# Patient Record
Sex: FEMALE | Race: WHITE | NOT HISPANIC OR LATINO | Employment: OTHER | URBAN - NONMETROPOLITAN AREA
[De-identification: names, ages, dates, MRNs, and addresses within clinical notes are randomized per-mention and may not be internally consistent; named-entity substitution may affect disease eponyms.]

---

## 2017-01-27 RX ORDER — LORAZEPAM 1 MG/1
1 TABLET ORAL EVERY 8 HOURS PRN
Qty: 60 TAB | Refills: 0 | Status: SHIPPED
Start: 2017-01-27 | End: 2017-01-31 | Stop reason: SDUPTHER

## 2017-01-31 ENCOUNTER — TELEPHONE (OUTPATIENT)
Dept: MEDICAL GROUP | Facility: PHYSICIAN GROUP | Age: 77
End: 2017-01-31

## 2017-01-31 RX ORDER — LORAZEPAM 1 MG/1
1 TABLET ORAL EVERY 8 HOURS PRN
Qty: 60 TAB | Refills: 0 | Status: SHIPPED
Start: 2017-01-31 | End: 2017-02-28 | Stop reason: SDUPTHER

## 2017-03-01 RX ORDER — LORAZEPAM 1 MG/1
TABLET ORAL
Qty: 60 TAB | Refills: 0 | Status: SHIPPED
Start: 2017-03-01 | End: 2017-04-04 | Stop reason: SDUPTHER

## 2017-03-01 RX ORDER — LEVOTHYROXINE SODIUM 88 UG/1
TABLET ORAL
Qty: 30 TAB | Refills: 3 | Status: SHIPPED | OUTPATIENT
Start: 2017-03-01 | End: 2017-06-15 | Stop reason: SDUPTHER

## 2017-03-14 ENCOUNTER — OFFICE VISIT (OUTPATIENT)
Dept: MEDICAL GROUP | Facility: PHYSICIAN GROUP | Age: 77
End: 2017-03-14
Payer: MEDICARE

## 2017-03-14 VITALS
TEMPERATURE: 98.2 F | HEIGHT: 61 IN | WEIGHT: 127 LBS | BODY MASS INDEX: 23.98 KG/M2 | DIASTOLIC BLOOD PRESSURE: 66 MMHG | RESPIRATION RATE: 14 BRPM | HEART RATE: 52 BPM | SYSTOLIC BLOOD PRESSURE: 118 MMHG | OXYGEN SATURATION: 96 %

## 2017-03-14 DIAGNOSIS — E03.9 HYPOTHYROIDISM, UNSPECIFIED TYPE: ICD-10-CM

## 2017-03-14 DIAGNOSIS — M25.562 LEFT KNEE PAIN, UNSPECIFIED CHRONICITY: ICD-10-CM

## 2017-03-14 DIAGNOSIS — K21.9 GASTROESOPHAGEAL REFLUX DISEASE WITHOUT ESOPHAGITIS: ICD-10-CM

## 2017-03-14 DIAGNOSIS — I49.9 IRREGULAR HEARTBEAT: ICD-10-CM

## 2017-03-14 DIAGNOSIS — F41.0 PANIC ATTACKS: ICD-10-CM

## 2017-03-14 DIAGNOSIS — I10 ESSENTIAL HYPERTENSION: ICD-10-CM

## 2017-03-14 DIAGNOSIS — F32.9 REACTIVE DEPRESSION: ICD-10-CM

## 2017-03-14 DIAGNOSIS — R00.1 BRADYCARDIA: ICD-10-CM

## 2017-03-14 PROCEDURE — 99214 OFFICE O/P EST MOD 30 MIN: CPT | Performed by: NURSE PRACTITIONER

## 2017-03-14 PROCEDURE — G8484 FLU IMMUNIZE NO ADMIN: HCPCS | Performed by: NURSE PRACTITIONER

## 2017-03-14 PROCEDURE — G8599 NO ASA/ANTIPLAT THER USE RNG: HCPCS | Performed by: NURSE PRACTITIONER

## 2017-03-14 PROCEDURE — G8420 CALC BMI NORM PARAMETERS: HCPCS | Performed by: NURSE PRACTITIONER

## 2017-03-14 PROCEDURE — 0518F FALL PLAN OF CARE DOCD: CPT | Mod: 8P | Performed by: NURSE PRACTITIONER

## 2017-03-14 PROCEDURE — 3288F FALL RISK ASSESSMENT DOCD: CPT | Mod: 8P | Performed by: NURSE PRACTITIONER

## 2017-03-14 PROCEDURE — G8432 DEP SCR NOT DOC, RNG: HCPCS | Performed by: NURSE PRACTITIONER

## 2017-03-14 PROCEDURE — 4040F PNEUMOC VAC/ADMIN/RCVD: CPT | Mod: 8P | Performed by: NURSE PRACTITIONER

## 2017-03-14 PROCEDURE — 1036F TOBACCO NON-USER: CPT | Performed by: NURSE PRACTITIONER

## 2017-03-14 PROCEDURE — 1100F PTFALLS ASSESS-DOCD GE2>/YR: CPT | Performed by: NURSE PRACTITIONER

## 2017-03-14 PROCEDURE — 93000 ELECTROCARDIOGRAM COMPLETE: CPT | Performed by: NURSE PRACTITIONER

## 2017-03-14 NOTE — PROGRESS NOTES
"Chief Complaint   Patient presents with   • Thyroid Problem     questions       HISTORY OF PRESENT ILLNESS: Patient is a @ age 76  here  today to discuss:    Interval history:     Hospitalizations NO     Injuries NO     Illness YES \" stated panic attack, EMS called, did not go to hospital.    Review of Systems   Constitutional: Negative for fever, chills, weight loss and Fatigue related to her anxiety  HENT: Negative for ear pain, nosebleeds, congestion, sore throat and neck pain.    Eyes: Negative for blurred vision.   Respiratory: Negative for cough, sputum production, shortness of breath and wheezing.    Cardiovascular: Negative for chest pain, palpitations, orthopnea and leg swelling.   Gastrointestinal: Negative for heartburn, nausea, vomiting and abdominal pain.   Genitourinary: Negative for dysuria, urgency and frequency.   Musculoskeletal: Severe bilateral knee pain  Skin: Negative for rash and itching.   Neurological: Negative for dizziness, tingling, tremors, sensory change, focal weakness and headaches.   Endo/Heme/Allergies: Does not bruise/bleed easily.   Psychiatric/Behavioral: DEPRESSED and anxious.           Hypothyroidism  Patient has chronic hypothyroidism. Patient takes levothyroxine 88 mcg daily. She is due for labs.     Panic attacks  Patient recently had a panic attack about 2 weeks ago. She felt lightheaded and jittery. She developed numbess and pain in arm. She call EMS. They assessed her and felt she was just having panic attack.       Depression  Patient states she is still having problems with mood and depression. She has multiple family problems that cause stress and depression. She remembers her childhood and problems, she does not want to do this, but her mind wanders. She then has physical symptoms.     GERD (gastroesophageal reflux disease)  Patient continues to use OTC gaviscon for relief. Tries to relieve the symptoms by curbing her diet.     Hypertension  Patient has normal blood " pressure today.   Symptoms:  Headache no , Weakness no , Visual loss no , Chest pain YES DURING anxiety, Dyspnea : NO , Claudication NO Swelling NO   Taking medication: NONE  Diet : standard diet  Exercise Limited due to knee pain.       Left knee pain  Patient is trying to rehab her knee. No surgery. No redness or swelling.     Irregular heartbeat  Patient has has extra heart sound left sternal border. Possible murmur.        Allergies:Tramadol    Current Outpatient Prescriptions Ordered in [facility]   Medication Sig Dispense Refill   • levothyroxine (SYNTHROID) 88 MCG Tab TAKE 1 TABLET BY MOUTH EVERY MORNING ON AN EMPTY STOMACH.  30 Tab 3   • lorazepam (ATIVAN) 1 MG Tab TAKE 1 TABLET BY MOUTH EVERY 8 HOURS AS NEEDED FOR ANXIETY. 60 Tab 0   • DHA-Vitamin C-Lutein (EYE HEALTH FORMULA PO) Take  by mouth.     • Cholecalciferol (VITAMIN D3) 2000 UNITS Tab Take  by mouth.     • Turmeric 450 MG Cap Take  by mouth.     • GLUCOSAMINE HCL PO Take 1,200 mg by mouth.     • ibuprofen (MOTRIN) 200 MG Tab Take 200 mg by mouth every 6 hours as needed.     • Diclofenac Sodium 1 % Gel Apply 4 g to skin as directed 3 times a day as needed. 100 g 0   • acetaminophen (TYLENOL) 500 MG TABS Take 500-1,000 mg by mouth every 6 hours as needed.       No current Epic-ordered facility-administered medications on file.       Past Medical History   Diagnosis Date   • Panic attacks 5/15/2013   • Depression 5/15/2013   • Osteopenia    • Osteoarthritis 5/15/2013   • Hypothyroidism 5/15/2013   • Vertigo 10/15/2014   • Bradycardia 10/15/2014   • Hyperlipidemia 2014   • GERD (gastroesophageal reflux disease) 3/18/2015   • Hyperlipemia 3/18/2015   • Hypertension    • Hypothyroid        Social History   Substance Use Topics   • Smoking status: Never Smoker    • Smokeless tobacco: Never Used   • Alcohol Use: 0.0 oz/week     0 Standard drinks or equivalent per week      Comment: rare       Family Status   Relation Status Death Age   • Father    "  • Mother       Family History   Problem Relation Age of Onset   • Heart Disease Father    • Diabetes Father    • Arthritis Mother      RA       ROS: As documented in my HPI      Exam:  Blood pressure 118/66, pulse 52, temperature 36.8 °C (98.2 °F), resp. rate 14, height 1.549 m (5' 1\"), weight 57.607 kg (127 lb), SpO2 96 %.  General:  Well nourished, well developed female in NAD  Head: Nontender scalp. No lesions  Neck: Supple. Symmetric Thyroid palpated. No bruits  Pulmonary:  Normal effort. No rales, ronchi, or wheezing.  Cardiovascular: RRR but unusual flow murmur. Left sternal border  Abdomen: Soft nontender, normal bowel sounds  Extremities: no clubbing, cyanosis, or edema.  Psych: Alert and oriented x3.  Emotional: Mood tearful  Affect pleasant and appropriate.     Neurological: No focal deficits    Please note that this dictation was created using voice recognition software. I have made every reasonable attempt to correct obvious errors, but I expect that there are errors of grammar and possibly content that I did not discover before finalizing the note.    Assessment/Plan:  1. Hypothyroidism, unspecified type - need labs.  Current status of condition is chronic and controlled on therapy.   TSH+FREE T4    COMP METABOLIC PANEL   2. Panic attacks - not controlled . Does use ativan as necessary, has enough medication VITAMIN D,25 HYDROXY    LIPID PANEL    REFERRAL TO BEHAVIORAL HEALTH   3. Reactive depression  REFERRAL TO BEHAVIORAL HEALTH   4. Gastroesophageal reflux disease without esophagitis  Current status of condition is chronic and controlled on therapy.     5. Essential hypertension  NO rx but controlled   6. Left knee pain, unspecified chronicity  Not controlled recommended to return to ortho.   7. Irregular heartbeat  EKG - Clinic Performed    REFERRAL TO CARDIOLOGY   8. Bradycardia   Stable            "

## 2017-03-14 NOTE — ASSESSMENT & PLAN NOTE
Patient has chronic hypothyroidism. Patient takes levothyroxine 88 mcg daily. She is due for labs.

## 2017-03-14 NOTE — ASSESSMENT & PLAN NOTE
Patient recently had a panic attack about 2 weeks ago. She felt lightheaded and jittery. She developed numbess and pain in arm. She call EMS. They assessed her and felt she was just having panic attack.

## 2017-03-14 NOTE — ASSESSMENT & PLAN NOTE
Patient continues to use OTC gaviscon for relief. Tries to relieve the symptoms by curbing her diet.

## 2017-03-14 NOTE — MR AVS SNAPSHOT
"        Lolly Florence   3/14/2017 1:00 PM   Office Visit   MRN: [id number]    Department:  [facility]   Dept Phone:  [phone]    Description:  Female : 1940   Provider:  AURORA Alford           Reason for Visit     Thyroid Problem questions      Allergies as of 3/14/2017     Allergen Noted Reactions    Tramadol 2016   Shortness of Breath      You were diagnosed with     Hypothyroidism, unspecified type   [3587723]       Panic attacks   [100851]       Reactive depression   [950990]       Gastroesophageal reflux disease without esophagitis   [970760]       Essential hypertension   [3478937]       Left knee pain, unspecified chronicity   [1029090]       Irregular heartbeat   [395024]       Bradycardia   [866819]         Vital Signs     Blood Pressure Pulse Temperature Respirations Height Weight    118/66 mmHg 52 36.8 °C (98.2 °F) 14 1.549 m (5' 1\") 57.607 kg (127 lb)    Body Mass Index Oxygen Saturation Smoking Status             24.01 kg/m2 96% Never Smoker          Basic Information     Date Of Birth Sex Race Ethnicity Preferred Language    1940 Female White Non- English      Problem List              ICD-10-CM Priority Class Noted - Resolved    Diverticular disease K57.90   5/15/2013 - Present    Panic attacks F41.0   5/15/2013 - Present    Depression F32.9   5/15/2013 - Present    Osteopenia M85.80   5/15/2013 - Present    Osteoarthritis M19.90   5/15/2013 - Present    Hypothyroidism E03.9   5/15/2013 - Present    Seborrheic keratosis    2013 - Present    Bradycardia R00.1   10/15/2014 - Present    Hyperlipidemia E78.5   2014 - Present    GERD (gastroesophageal reflux disease) K21.9   3/18/2015 - Present    PVC (premature ventricular contraction) I49.3   2015 - Present    Hypertension I10   2015 - Present    Carotid artery stenosis I65.29   2015 - Present    Gallstones K80.20   2015 - Present    Epigastric pain R10.13   2015 - " Present    Macular degeneration H35.30   2/23/2016 - Present    Left knee pain M25.562   7/5/2016 - Present    Torn meniscus S83.209A   12/12/2016 - Present    Irregular heartbeat I49.9   3/14/2017 - Present      Health Maintenance        Date Due Completion Dates    IMM DTaP/Tdap/Td Vaccine (1 - Tdap) 6/6/1957 ---    IMM ZOSTER VACCINE 6/6/1998 ---    IMM PNEUMOCOCCAL 65+ (ADULT) LOW/MEDIUM RISK SERIES (1 of 2 - PCV13) 6/6/2003 ---    MAMMOGRAM 5/15/2014 5/15/2013 (Declined)    Override on 5/15/2013: Patient Declined (Discussed with patient, declines)    IMM INFLUENZA (1) 10/2/2017 (Originally 9/1/2016) ---    COLONOSCOPY 1/15/2018 1/15/2013 (Done), 11/30/2012    Override on 1/15/2013: Done (Enio at [facility])    BONE DENSITY 3/15/2018 3/15/2013 (Done)    Override on 3/15/2013: Done (Dr. Nestor PRIETO, [facility])            Current Immunizations     No immunizations on file.      Below and/or attached are the medications your provider expects you to take. Review all of your home medications and newly ordered medications with your provider and/or pharmacist. Follow medication instructions as directed by your provider and/or pharmacist. Please keep your medication list with you and share with your provider. Update the information when medications are discontinued, doses are changed, or new medications (including over-the-counter products) are added; and carry medication information at all times in the event of emergency situations     Allergies:  TRAMADOL - Shortness of Breath               Medications  Valid as of: March 14, 2017 -  1:58 PM    Generic Name Brand Name Tablet Size Instructions for use    Acetaminophen (Tab) TYLENOL 500 MG Take 500-1,000 mg by mouth every 6 hours as needed.        Cholecalciferol (Tab) Vitamin D3 2000 UNITS Take  by mouth.        DHA-Vitamin C-Lutein   Take  by mouth.        Diclofenac Sodium (Gel) Diclofenac Sodium 1 % Apply 4 g to skin as directed 3  times a day as needed.        Glucosamine HCl   Take 1,200 mg by mouth.        Ibuprofen (Tab) MOTRIN 200 MG Take 200 mg by mouth every 6 hours as needed.        Levothyroxine Sodium (Tab) SYNTHROID 88 MCG TAKE 1 TABLET BY MOUTH EVERY MORNING ON AN EMPTY STOMACH.         LORazepam (Tab) ATIVAN 1 MG TAKE 1 TABLET BY MOUTH EVERY 8 HOURS AS NEEDED FOR ANXIETY.        Turmeric (Cap) Turmeric 450 MG Take  by mouth.        .                 Medicines prescribed today were sent to:     [id number] [place], [place] - [address] [place] [address]    Phone: [phone] Fax: [phone]    Open 24 Hours?: No      Medication refill instructions:       If your prescription bottle indicates you have medication refills left, it is not necessary to call your provider’s office. Please contact your pharmacy and they will refill your medication.    If your prescription bottle indicates you do not have any refills left, you may request refills at any time through one of the following ways: The online [employer] system (except Urgent Care), by calling your provider’s office, or by asking your pharmacy to contact your provider’s office with a refill request. Medication refills are processed only during regular business hours and may not be available until the next business day. Your provider may request additional information or to have a follow-up visit with you prior to refilling your medication.   *Please Note: Medication refills are assigned a new Rx number when refilled electronically. Your pharmacy may indicate that no refills were authorized even though a new prescription for the same medication is available at the pharmacy. Please request the medicine by name with the pharmacy before contacting your provider for a refill.        Your To Do List     Future Labs/Procedures Complete By Expires    COMP METABOLIC PANEL  As directed 3/14/2018    VITAMIN D,25 HYDROXY  As directed 3/14/2018         Referral     A referral request has been sent to our patient care coordination department. Please allow 3-5 business days for us to process this request and contact you either by phone or mail. If you do not hear from us by the 5th business day, please call us at [phone].           MyChart Status: Patient Declined

## 2017-03-14 NOTE — ASSESSMENT & PLAN NOTE
Patient has normal blood pressure today.   Symptoms:  Headache no , Weakness no , Visual loss no , Chest pain YES DURING anxiety, Dyspnea : NO , Claudication NO Swelling NO   Taking medication: NONE  Diet : standard diet  Exercise Limited due to knee pain.

## 2017-03-14 NOTE — ASSESSMENT & PLAN NOTE
Patient states she is still having problems with mood and depression. She has multiple family problems that cause stress and depression. She remembers her childhood and problems, she does not want to do this, but her mind wanders. She then has physical symptoms.

## 2017-03-20 ENCOUNTER — HOSPITAL ENCOUNTER (OUTPATIENT)
Dept: LAB | Facility: MEDICAL CENTER | Age: 77
End: 2017-03-20
Attending: NURSE PRACTITIONER
Payer: MEDICARE

## 2017-03-20 LAB
CHOLEST SERPL-MCNC: 221 MG/DL (ref 100–199)
HDLC SERPL-MCNC: 54 MG/DL
LDLC SERPL CALC-MCNC: 138 MG/DL
T4 FREE SERPL-MCNC: 1.24 NG/DL (ref 0.53–1.43)
TRIGL SERPL-MCNC: 147 MG/DL (ref 0–149)
TSH SERPL DL<=0.005 MIU/L-ACNC: 1.05 UIU/ML (ref 0.3–3.7)

## 2017-03-20 PROCEDURE — 36415 COLL VENOUS BLD VENIPUNCTURE: CPT

## 2017-03-20 PROCEDURE — 84443 ASSAY THYROID STIM HORMONE: CPT

## 2017-03-20 PROCEDURE — 80061 LIPID PANEL: CPT

## 2017-03-20 PROCEDURE — 84439 ASSAY OF FREE THYROXINE: CPT

## 2017-03-27 ENCOUNTER — APPOINTMENT (OUTPATIENT)
Dept: RADIOLOGY | Facility: IMAGING CENTER | Age: 77
End: 2017-03-27
Attending: PHYSICIAN ASSISTANT
Payer: MEDICARE

## 2017-03-27 ENCOUNTER — OFFICE VISIT (OUTPATIENT)
Dept: URGENT CARE | Facility: PHYSICIAN GROUP | Age: 77
End: 2017-03-27
Payer: MEDICARE

## 2017-03-27 VITALS
BODY MASS INDEX: 23.22 KG/M2 | OXYGEN SATURATION: 91 % | HEART RATE: 90 BPM | RESPIRATION RATE: 12 BRPM | HEIGHT: 61 IN | WEIGHT: 123 LBS | SYSTOLIC BLOOD PRESSURE: 114 MMHG | DIASTOLIC BLOOD PRESSURE: 76 MMHG | TEMPERATURE: 99.2 F
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
## 2017-03-27 DIAGNOSIS — R07.81 RIB PAIN ON RIGHT SIDE: ICD-10-CM

## 2017-03-27 DIAGNOSIS — R07.81 RIB PAIN ON LEFT SIDE: ICD-10-CM

## 2017-03-27 DIAGNOSIS — R06.02 SOB (SHORTNESS OF BREATH): ICD-10-CM
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
## 2017-03-27 PROCEDURE — 0518F FALL PLAN OF CARE DOCD: CPT | Mod: 8P | Performed by: PHYSICIAN ASSISTANT

## 2017-03-27 PROCEDURE — 4040F PNEUMOC VAC/ADMIN/RCVD: CPT | Mod: 8P | Performed by: PHYSICIAN ASSISTANT

## 2017-03-27 PROCEDURE — 99214 OFFICE O/P EST MOD 30 MIN: CPT | Performed by: PHYSICIAN ASSISTANT

## 2017-03-27 PROCEDURE — 3288F FALL RISK ASSESSMENT DOCD: CPT | Mod: 8P | Performed by: PHYSICIAN ASSISTANT

## 2017-03-27 PROCEDURE — G8420 CALC BMI NORM PARAMETERS: HCPCS | Performed by: PHYSICIAN ASSISTANT

## 2017-03-27 PROCEDURE — G8484 FLU IMMUNIZE NO ADMIN: HCPCS | Performed by: PHYSICIAN ASSISTANT

## 2017-03-27 PROCEDURE — 71020 DX-CHEST-2 VIEWS: CPT | Mod: 26 | Performed by: PHYSICIAN ASSISTANT

## 2017-03-27 PROCEDURE — G8599 NO ASA/ANTIPLAT THER USE RNG: HCPCS | Performed by: PHYSICIAN ASSISTANT

## 2017-03-27 PROCEDURE — 1100F PTFALLS ASSESS-DOCD GE2>/YR: CPT | Performed by: PHYSICIAN ASSISTANT

## 2017-03-27 PROCEDURE — G8432 DEP SCR NOT DOC, RNG: HCPCS | Performed by: PHYSICIAN ASSISTANT

## 2017-03-27 PROCEDURE — 1036F TOBACCO NON-USER: CPT | Performed by: PHYSICIAN ASSISTANT

## 2017-03-27 NOTE — MR AVS SNAPSHOT
"        Lolly Florence   3/27/2017 1:30 PM   Office Visit   MRN: [id number]    Department:  [facility]   Dept Phone:  [phone]    Description:  Female : 1940   Provider:  Meghan Riley PA-C           Reason for Visit     Rib Injury Pt fell from chair, injuring ribs, difficulty breathing      Allergies as of 3/27/2017     Allergen Noted Reactions    Tramadol 2016   Shortness of Breath      You were diagnosed with     Fall at home, initial encounter   [612626]       Rib pain on left side   [517210]       Rib pain on right side   [483373]       SOB (shortness of breath)   [581444]         Vital Signs     Blood Pressure Pulse Temperature Respirations Height Weight    114/76 mmHg 90 37.3 °C (99.2 °F) 12 1.549 m (5' 1\") 55.792 kg (123 lb)    Body Mass Index Oxygen Saturation Smoking Status             23.25 kg/m2 91% Never Smoker          Basic Information     Date Of Birth Sex Race Ethnicity Preferred Language    1940 Female White Non- English      Your appointments     2017 10:00 AM   Telemedicine Clinic New Patient with Mati Dominguez MD,FACC, [facility] -[facility] ([place])    [facility] [facility] [id number] [phone]              Problem List              ICD-10-CM Priority Class Noted - Resolved    Diverticular disease K57.90   5/15/2013 - Present    Panic attacks F41.0   5/15/2013 - Present    Depression F32.9   5/15/2013 - Present    Osteopenia M85.80   5/15/2013 - Present    Osteoarthritis M19.90   5/15/2013 - Present    Hypothyroidism E03.9   5/15/2013 - Present    Seborrheic keratosis    2013 - Present    Bradycardia R00.1   10/15/2014 - Present    Hyperlipidemia E78.5   2014 - Present    GERD (gastroesophageal reflux disease) K21.9   3/18/2015 - Present    PVC (premature ventricular contraction) I49.3   2015 - Present    Hypertension I10   2015 - Present   " Carotid artery stenosis I65.29   4/17/2015 - Present    Gallstones K80.20   6/22/2015 - Present    Epigastric pain R10.13   7/7/2015 - Present    Macular degeneration H35.30   2/23/2016 - Present    Left knee pain M25.562   7/5/2016 - Present    Torn meniscus S83.209A   12/12/2016 - Present    Irregular heartbeat I49.9   3/14/2017 - Present      Health Maintenance        Date Due Completion Dates    IMM DTaP/Tdap/Td Vaccine (1 - Tdap) 6/6/1957 ---    IMM ZOSTER VACCINE 6/6/1998 ---    IMM PNEUMOCOCCAL 65+ (ADULT) LOW/MEDIUM RISK SERIES (1 of 2 - PCV13) 6/6/2003 ---    MAMMOGRAM 5/15/2014 5/15/2013 (Declined)    Override on 5/15/2013: Patient Declined (Discussed with patient, declines)    IMM INFLUENZA (1) 10/2/2017 (Originally 9/1/2016) ---    COLONOSCOPY 1/15/2018 1/15/2013 (Done), 11/30/2012    Override on 1/15/2013: Done ([facility] at [facility])    BONE DENSITY 3/15/2018 3/15/2013 (Done)    Override on 3/15/2013: Done (Dr. Nestor PRIETO, [facility])            Current Immunizations     No immunizations on file.      Below and/or attached are the medications your provider expects you to take. Review all of your home medications and newly ordered medications with your provider and/or pharmacist. Follow medication instructions as directed by your provider and/or pharmacist. Please keep your medication list with you and share with your provider. Update the information when medications are discontinued, doses are changed, or new medications (including over-the-counter products) are added; and carry medication information at all times in the event of emergency situations     Allergies:  TRAMADOL - Shortness of Breath               Medications  Valid as of: March 27, 2017 -  2:22 PM    Generic Name Brand Name Tablet Size Instructions for use    Acetaminophen (Tab) TYLENOL 500 MG Take 500-1,000 mg by mouth every 6 hours as needed.        Cholecalciferol (Tab) Vitamin D3 2000 UNITS Take  by  mouth.        DHA-Vitamin C-Lutein   Take  by mouth.        Diclofenac Sodium (Gel) Diclofenac Sodium 1 % Apply 4 g to skin as directed 3 times a day as needed.        Glucosamine HCl   Take 1,200 mg by mouth.        Ibuprofen (Tab) MOTRIN 200 MG Take 200 mg by mouth every 6 hours as needed.        Levothyroxine Sodium (Tab) SYNTHROID 88 MCG TAKE 1 TABLET BY MOUTH EVERY MORNING ON AN EMPTY STOMACH.         LORazepam (Tab) ATIVAN 1 MG TAKE 1 TABLET BY MOUTH EVERY 8 HOURS AS NEEDED FOR ANXIETY.        Turmeric (Cap) Turmeric 450 MG Take  by mouth.        .                 Medicines prescribed today were sent to:     [id number] - [place], [place] - [address]    [address] [place] [address]    Phone: [phone] Fax: [phone]    Open 24 Hours?: No      Medication refill instructions:       If your prescription bottle indicates you have medication refills left, it is not necessary to call your provider’s office. Please contact your pharmacy and they will refill your medication.    If your prescription bottle indicates you do not have any refills left, you may request refills at any time through one of the following ways: The online [employer] system (except Urgent Care), by calling your provider’s office, or by asking your pharmacy to contact your provider’s office with a refill request. Medication refills are processed only during regular business hours and may not be available until the next business day. Your provider may request additional information or to have a follow-up visit with you prior to refilling your medication.   *Please Note: Medication refills are assigned a new Rx number when refilled electronically. Your pharmacy may indicate that no refills were authorized even though a new prescription for the same medication is available at the pharmacy. Please request the medicine by name with the pharmacy before contacting your provider for a refill.        Your To Do List      Future Labs/Procedures Complete By Expires    DX-CHEST-2 VIEWS  As directed 9/26/2017         MyChart Status: Patient Declined

## 2017-03-27 NOTE — PROGRESS NOTES
Chief Complaint   Patient presents with   • Rib Injury     Pt fell from chair, injuring ribs, difficulty breathing       HISTORY OF PRESENT ILLNESS: Patient is a 76 y.o. female who presents today for evaluation of a ground level fall that occurred 6 days ago, last Tuesday. Patient was getting up from her desk when her foot got caught on the leg of the desk, causing her to fall. Patient complains of pain in the ribs on both sides with associated difficulty breathing. She complained of mild dizziness intermittently when she exerts herself. She has worsening pain with deep inspiration. She denies any history of rib problems.    Patient Active Problem List    Diagnosis Date Noted   • Irregular heartbeat 03/14/2017   • Torn meniscus 12/12/2016   • Left knee pain 07/05/2016   • Macular degeneration 02/23/2016   • Epigastric pain 07/07/2015   • Gallstones 06/22/2015   • PVC (premature ventricular contraction) 04/17/2015   • Hypertension 04/17/2015   • Carotid artery stenosis 04/17/2015   • GERD (gastroesophageal reflux disease) 03/18/2015   • Hyperlipidemia 12/02/2014   • Bradycardia 10/15/2014   • Seborrheic keratosis 07/16/2013   • Diverticular disease 05/15/2013   • Panic attacks 05/15/2013   • Depression 05/15/2013   • Osteopenia 05/15/2013   • Osteoarthritis 05/15/2013   • Hypothyroidism 05/15/2013       Allergies:Tramadol    Current Outpatient Prescriptions Ordered in [facility]   Medication Sig Dispense Refill   • levothyroxine (SYNTHROID) 88 MCG Tab TAKE 1 TABLET BY MOUTH EVERY MORNING ON AN EMPTY STOMACH.  30 Tab 3   • lorazepam (ATIVAN) 1 MG Tab TAKE 1 TABLET BY MOUTH EVERY 8 HOURS AS NEEDED FOR ANXIETY. 60 Tab 0   • DHA-Vitamin C-Lutein (EYE HEALTH FORMULA PO) Take  by mouth.     • Cholecalciferol (VITAMIN D3) 2000 UNITS Tab Take  by mouth.     • Turmeric 450 MG Cap Take  by mouth.     • GLUCOSAMINE HCL PO Take 1,200 mg by mouth.     • ibuprofen (MOTRIN) 200 MG Tab Take 200 mg by mouth every 6 hours as needed.     •  "Diclofenac Sodium 1 % Gel Apply 4 g to skin as directed 3 times a day as needed. 100 g 0   • acetaminophen (TYLENOL) 500 MG TABS Take 500-1,000 mg by mouth every 6 hours as needed.       No current Epic-ordered facility-administered medications on file.       Past Medical History   Diagnosis Date   • Panic attacks 5/15/2013   • Depression 5/15/2013   • Osteopenia    • Osteoarthritis 5/15/2013   • Hypothyroidism 5/15/2013   • Vertigo 10/15/2014   • Bradycardia 10/15/2014   • Hyperlipidemia 2014   • GERD (gastroesophageal reflux disease) 3/18/2015   • Hyperlipemia 3/18/2015   • Hypertension    • Hypothyroid        Social History   Substance Use Topics   • Smoking status: Never Smoker    • Smokeless tobacco: Never Used   • Alcohol Use: 0.0 oz/week     0 Standard drinks or equivalent per week      Comment: rare       Family Status   Relation Status Death Age   • Father     • Mother       Family History   Problem Relation Age of Onset   • Heart Disease Father    • Diabetes Father    • Arthritis Mother      RA       ROS:    Review of Systems   Constitutional: Negative for fever, chills, weight loss and malaise/fatigue.   HENT: Negative for ear pain, nosebleeds, congestion, sore throat and neck pain.    Eyes: Negative for blurred vision.   Respiratory: Negative for cough, sputum production, and wheezing.    Cardiovascular: Negative for chest pain, palpitations, orthopnea and leg swelling.   Gastrointestinal: Negative for heartburn, nausea, vomiting and abdominal pain.   Genitourinary: Negative for dysuria, urgency and frequency.       Exam:  Blood pressure 114/76, pulse 90, temperature 37.3 °C (99.2 °F), resp. rate 12, height 1.549 m (5' 1\"), weight 55.792 kg (123 lb), SpO2 91 %.  General: Normal appearing. No distress.  HEENT: Head is grossly normal.  Pulmonary: Clear to ausculation and percussion.  Normal effort. No rales, ronchi, or wheezing.   Cardiovascular: Regular rate and rhythm without murmur. "  No paradoxical movement noted on the ribs.  Neurologic: Grossly nonfocal.  Lymph: No cervical lymphadenopathy noted.  Skin: No obvious lesions.  Psych: Normal mood. Alert and oriented x3. Judgment and insight is normal.    CXR, per radiology:   Impression               No active disease.            Assessment/Plan:  Discussed x-ray results with patient. Discussed appropriate over-the-counter symptomatic medication, and when to return to clinic. Discussed the importance of taking deep breaths several times throughout the day. Follow up for worsening or persistent symptoms.  1. Fall at home, initial encounter     2. Rib pain on left side  DX-CHEST-2 VIEWS   3. Rib pain on right side  DX-CHEST-2 VIEWS   4. SOB (shortness of breath)  DX-CHEST-2 VIEWS

## 2017-04-06 RX ORDER — LORAZEPAM 1 MG/1
TABLET ORAL
Qty: 60 TAB | Refills: 0 | Status: SHIPPED
Start: 2017-04-06 | End: 2017-04-13 | Stop reason: SDUPTHER

## 2017-04-07 ENCOUNTER — TELEPHONE (OUTPATIENT)
Dept: MEDICAL GROUP | Facility: PHYSICIAN GROUP | Age: 77
End: 2017-04-07

## 2017-04-07 NOTE — TELEPHONE ENCOUNTER
Rose Marie requesting refill Ativan. Monica Fowler approved 4/6/17. RX information given to pharmacist over the phone.   Will be ready for  today. Lolly notified

## 2017-06-20 ENCOUNTER — OFFICE VISIT (OUTPATIENT)
Dept: CARDIOLOGY | Facility: PHYSICIAN GROUP | Age: 77
End: 2017-06-20
Payer: MEDICARE

## 2017-06-20 VITALS
HEIGHT: 61 IN | BODY MASS INDEX: 23.6 KG/M2 | OXYGEN SATURATION: 95 % | WEIGHT: 125 LBS | DIASTOLIC BLOOD PRESSURE: 80 MMHG | HEART RATE: 78 BPM | SYSTOLIC BLOOD PRESSURE: 140 MMHG

## 2017-06-20 DIAGNOSIS — E78.5 DYSLIPIDEMIA: ICD-10-CM

## 2017-06-20 DIAGNOSIS — I65.23 BILATERAL CAROTID ARTERY STENOSIS: ICD-10-CM

## 2017-06-20 DIAGNOSIS — I49.3 PVC (PREMATURE VENTRICULAR CONTRACTION): ICD-10-CM

## 2017-06-20 DIAGNOSIS — I10 ESSENTIAL HYPERTENSION, BENIGN: ICD-10-CM

## 2017-06-20 PROCEDURE — 99214 OFFICE O/P EST MOD 30 MIN: CPT | Performed by: INTERNAL MEDICINE

## 2017-06-20 RX ORDER — EZETIMIBE 10 MG/1
10 TABLET ORAL DAILY
Qty: 10 TAB | Refills: 11 | Status: SHIPPED | OUTPATIENT
Start: 2017-06-20 | End: 2018-06-07

## 2017-06-20 ASSESSMENT — ENCOUNTER SYMPTOMS
FEVER: 0
DEPRESSION: 1
SHORTNESS OF BREATH: 0
PALPITATIONS: 1
BLURRED VISION: 0
CONSTIPATION: 1
ABDOMINAL PAIN: 1
ORTHOPNEA: 0
DIARRHEA: 1
CHILLS: 0
INSOMNIA: 0
MYALGIAS: 0
LOSS OF CONSCIOUSNESS: 0
DIZZINESS: 0
PND: 0
NERVOUS/ANXIOUS: 1

## 2017-06-20 NOTE — Clinical Note
[facility] [address]  Phone: [phone]  Fax: [phone]              Lolly Florence  1940    Encounter Date: 6/20/2017    Chemo Jang M.D.          PROGRESS NOTE:  Subjective:   Lolly Florence is a 77 y.o. female who presents today for annual follow up of premature ventricular contractions, hypertension and hyperlipidemia.    Since the patient's last visit on 06/19/15, she has been doing well clinically from cardiac standpoint. She admits to mild palpitations associated with anxiety. She denies chest pain, shortness of breath, nausea/vomiting or diaphoresis. She continues to be under significant stress currently due to family issues. She suffers with anxiety and panic attacks.    Past Medical History   Diagnosis Date   • Panic attacks 5/15/2013   • Depression 5/15/2013   • Osteopenia    • Osteoarthritis 5/15/2013   • Hypothyroidism 5/15/2013   • Vertigo 10/15/2014   • Bradycardia 10/15/2014   • Hyperlipidemia 12/2/2014   • GERD (gastroesophageal reflux disease) 3/18/2015   • Hyperlipemia 3/18/2015   • Hypertension    • Hypothyroid      Past Surgical History   Procedure Laterality Date   • Hiatal hernia repair  2007     Family History   Problem Relation Age of Onset   • Heart Disease Father    • Diabetes Father    • Arthritis Mother      RA     History   Smoking status   • Never Smoker    Smokeless tobacco   • Never Used     Allergies   Allergen Reactions   • Tramadol Shortness of Breath     Medications reviewed.    Outpatient Encounter Prescriptions as of 6/20/2017   Medication Sig Dispense Refill   • lorazepam (ATIVAN) 1 MG Tab TAKE 1 TABLET BY MOUTH EVERY 8 HOURS AS NEEDED FOR ANXIETY. 60 Tab 3   • levothyroxine (SYNTHROID) 88 MCG Tab Take 1 Tab by mouth every morning. ON AN EMPTY STOMACH. 90 Tab 1   • DHA-Vitamin C-Lutein (EYE HEALTH FORMULA PO) Take  by mouth.     • ibuprofen (MOTRIN) 200 MG  "Tab Take 200 mg by mouth every 6 hours as needed.     • acetaminophen (TYLENOL) 500 MG TABS Take 500-1,000 mg by mouth every 6 hours as needed.     • Cholecalciferol (VITAMIN D3) 2000 UNITS Tab Take  by mouth.     • Turmeric 450 MG Cap Take  by mouth.     • GLUCOSAMINE HCL PO Take 1,200 mg by mouth.     • Diclofenac Sodium 1 % Gel Apply 4 g to skin as directed 3 times a day as needed. 100 g 0     No facility-administered encounter medications on file as of 6/20/2017.     Review of Systems   Constitutional: Negative for fever and chills.   HENT: Negative for congestion.    Eyes: Negative for blurred vision.   Respiratory: Negative for shortness of breath.    Cardiovascular: Positive for palpitations. Negative for chest pain, orthopnea, leg swelling and PND.   Gastrointestinal: Positive for abdominal pain, diarrhea and constipation.   Genitourinary: Negative for dysuria.   Musculoskeletal: Negative for myalgias and joint pain.   Skin: Negative for rash.   Neurological: Negative for dizziness and loss of consciousness.   Psychiatric/Behavioral: Positive for depression. The patient is nervous/anxious. The patient does not have insomnia.         Objective:   /80 mmHg  Pulse 78  Ht 1.549 m (5' 0.98\")  Wt 56.7 kg (125 lb)  BMI 23.63 kg/m2  SpO2 95%    Physical Exam   Constitutional: She is oriented to person, place, and time. She appears well-developed and well-nourished.   HENT:   Head: Normocephalic and atraumatic.   Eyes: Conjunctivae are normal. Pupils are equal, round, and reactive to light.   Neck: Normal range of motion. Neck supple.   Cardiovascular: Normal rate and regular rhythm.    Pulmonary/Chest: Effort normal and breath sounds normal.   Abdominal: Soft. Bowel sounds are normal.   Musculoskeletal: Normal range of motion. She exhibits no edema.   Neurological: She is alert and oriented to person, place, and time.   Skin: Skin is warm and dry.   Psychiatric: She exhibits a depressed mood.     CARDIAC " STUDIES/PROCEDURES:    CAROTID ULTRASOUND (08/14/14)  Carotid ultrasound showing mild stenosis.    ECHOCARDIOGRAM CONCLUSIONS (08/14/14)  Echocardiogram showing ejection fraction of >74%, mild mitral regurgitation.    EKG performed on (03/14/17) was reviewed: EKG shows sinus rhythm.  EKG performed on (10/23/14) EKG shows normal sinus rhythm with premature ventricular contraction.    Laboratory results of (03/20/17) were reviewed. Cholesterol profile of 221/147/54/138 noted.  Laboratory results of (11/21/14) Cholesterol profile of 220/179/47/137 noted.    MPI CONCLUSIONS (08/14/14)  No evidence of ischemia or infarct.    Assessment:      1. PVC (premature ventricular contraction)     2. Essential hypertension, benign     3. Dyslipidemia     4. Bilateral carotid artery stenosis       Medical Decision Making:  Today's Assessment / Status / Plan:     1. Premature ventricular contractions: Frequent premature ventricular contractions (12,000) was noted on Holter monitor at [facility] by Dr. Ferguson. We discussed initiation of beta blockade therapy, however, she does not wish to start this medication. We will repeat an echocardiogram.  2. Hypertension: Blood pressure is well controlled.  3. Hyperlipidemia with intolerance to statin therapy and Omega 3 FA: Currently lipid control is not well controlled on diet therapy. We will try Zetia.  4. Carotid artery stenosis: Clinically stable on medical therapy. We will repeat a carotid ultrasound.  5. Medication intolerance: She is intolerant to lisinopril due to dizziness.  6. Health maintenance: Emotional stress: She is undergoing significant emotional stress. Emotional counseling continues to be recommended.    We will follow up in three months.    CC Monica Uribe        No Recipients

## 2017-06-20 NOTE — PROGRESS NOTES
Subjective:   Lolly Florence is a 77 y.o. female who presents today for annual follow up of premature ventricular contractions, hypertension and hyperlipidemia.    Since the patient's last visit on 06/19/15, she has been doing well clinically from cardiac standpoint. She admits to mild palpitations associated with anxiety. She denies chest pain, shortness of breath, nausea/vomiting or diaphoresis. She continues to be under significant stress currently due to family issues. She suffers with anxiety and panic attacks.    Past Medical History   Diagnosis Date   • Panic attacks 5/15/2013   • Depression 5/15/2013   • Osteopenia    • Osteoarthritis 5/15/2013   • Hypothyroidism 5/15/2013   • Vertigo 10/15/2014   • Bradycardia 10/15/2014   • Hyperlipidemia 12/2/2014   • GERD (gastroesophageal reflux disease) 3/18/2015   • Hyperlipemia 3/18/2015   • Hypertension    • Hypothyroid      Past Surgical History   Procedure Laterality Date   • Hiatal hernia repair  2007     Family History   Problem Relation Age of Onset   • Heart Disease Father    • Diabetes Father    • Arthritis Mother      RA     History   Smoking status   • Never Smoker    Smokeless tobacco   • Never Used     Allergies   Allergen Reactions   • Tramadol Shortness of Breath     Medications reviewed.    Outpatient Encounter Prescriptions as of 6/20/2017   Medication Sig Dispense Refill   • lorazepam (ATIVAN) 1 MG Tab TAKE 1 TABLET BY MOUTH EVERY 8 HOURS AS NEEDED FOR ANXIETY. 60 Tab 3   • levothyroxine (SYNTHROID) 88 MCG Tab Take 1 Tab by mouth every morning. ON AN EMPTY STOMACH. 90 Tab 1   • DHA-Vitamin C-Lutein (EYE HEALTH FORMULA PO) Take  by mouth.     • ibuprofen (MOTRIN) 200 MG Tab Take 200 mg by mouth every 6 hours as needed.     • acetaminophen (TYLENOL) 500 MG TABS Take 500-1,000 mg by mouth every 6 hours as needed.     • Cholecalciferol (VITAMIN D3) 2000 UNITS Tab Take  by mouth.     • Turmeric 450 MG Cap Take  by mouth.     • GLUCOSAMINE HCL PO  "Take 1,200 mg by mouth.     • Diclofenac Sodium 1 % Gel Apply 4 g to skin as directed 3 times a day as needed. 100 g 0     No facility-administered encounter medications on file as of 6/20/2017.     Review of Systems   Constitutional: Negative for fever and chills.   HENT: Negative for congestion.    Eyes: Negative for blurred vision.   Respiratory: Negative for shortness of breath.    Cardiovascular: Positive for palpitations. Negative for chest pain, orthopnea, leg swelling and PND.   Gastrointestinal: Positive for abdominal pain, diarrhea and constipation.   Genitourinary: Negative for dysuria.   Musculoskeletal: Negative for myalgias and joint pain.   Skin: Negative for rash.   Neurological: Negative for dizziness and loss of consciousness.   Psychiatric/Behavioral: Positive for depression. The patient is nervous/anxious. The patient does not have insomnia.         Objective:   /80 mmHg  Pulse 78  Ht 1.549 m (5' 0.98\")  Wt 56.7 kg (125 lb)  BMI 23.63 kg/m2  SpO2 95%    Physical Exam   Constitutional: She is oriented to person, place, and time. She appears well-developed and well-nourished.   HENT:   Head: Normocephalic and atraumatic.   Eyes: Conjunctivae are normal. Pupils are equal, round, and reactive to light.   Neck: Normal range of motion. Neck supple.   Cardiovascular: Normal rate and regular rhythm.    Pulmonary/Chest: Effort normal and breath sounds normal.   Abdominal: Soft. Bowel sounds are normal.   Musculoskeletal: Normal range of motion. She exhibits no edema.   Neurological: She is alert and oriented to person, place, and time.   Skin: Skin is warm and dry.   Psychiatric: She exhibits a depressed mood.     CARDIAC STUDIES/PROCEDURES:    CAROTID ULTRASOUND (08/14/14)  Carotid ultrasound showing mild stenosis.    ECHOCARDIOGRAM CONCLUSIONS (08/14/14)  Echocardiogram showing ejection fraction of >74%, mild mitral regurgitation.    EKG performed on (03/14/17) was reviewed: EKG shows sinus " rhythm.  EKG performed on (10/23/14) EKG shows normal sinus rhythm with premature ventricular contraction.    Laboratory results of (03/20/17) were reviewed. Cholesterol profile of 221/147/54/138 noted.  Laboratory results of (11/21/14) Cholesterol profile of 220/179/47/137 noted.    MPI CONCLUSIONS (08/14/14)  No evidence of ischemia or infarct.    Assessment:      1. PVC (premature ventricular contraction)     2. Essential hypertension, benign     3. Dyslipidemia     4. Bilateral carotid artery stenosis       Medical Decision Making:  Today's Assessment / Status / Plan:     1. Premature ventricular contractions: Frequent premature ventricular contractions (12,000) was noted on Holter monitor at [facility] by Dr. Ferguson. We discussed initiation of beta blockade therapy, however, she does not wish to start this medication. We will repeat an echocardiogram.  2. Hypertension: Blood pressure is well controlled.  3. Hyperlipidemia with intolerance to statin therapy and Omega 3 FA: Currently lipid control is not well controlled on diet therapy. We will try Zetia.  4. Carotid artery stenosis: Clinically stable on medical therapy. We will repeat a carotid ultrasound.  5. Medication intolerance: She is intolerant to lisinopril due to dizziness.  6. Health maintenance: Emotional stress: She is undergoing significant emotional stress. Emotional counseling continues to be recommended.    We will follow up in three months.    Monica Erazo Ms.

## 2017-06-27 DIAGNOSIS — E78.5 DYSLIPIDEMIA: ICD-10-CM

## 2017-07-06 ENCOUNTER — TELEPHONE (OUTPATIENT)
Dept: CARDIOLOGY | Facility: MEDICAL CENTER | Age: 77
End: 2017-07-06

## 2017-07-06 DIAGNOSIS — E78.5 DYSLIPIDEMIA: ICD-10-CM

## 2017-07-06 NOTE — TELEPHONE ENCOUNTER
Called both numbers and no answer. LM to please repeat lab work just prior to f/u dada/ SELENA 10/20 and to please let us know that she got this message. Explained that cholesterol needs to be rechecked in 3 months to evaluate effects of starting Zetia. Mailed lab order to pts home.     ----- Message from Chemo Jang M.D. sent at 7/5/2017  3:59 PM PDT -----  Please repeat fasting lipid profile in 3 months.    Thanks.  SELENA.  ----- Message -----     From: Dorys Herrmann R.N.     Sent: 6/27/2017   3:08 PM       To: Chemo Jang M.D.    Follow up scheduled 10/20/2017 with Dr. Jang

## 2017-07-11 RX ORDER — LEVOTHYROXINE SODIUM 88 UG/1
TABLET ORAL
Qty: 30 TAB | Refills: 5 | Status: SHIPPED | OUTPATIENT
Start: 2017-07-11 | End: 2018-01-15 | Stop reason: SDUPTHER

## 2017-07-11 NOTE — TELEPHONE ENCOUNTER
Was the patient seen in the last year in this department? Yes   3/17    Does patient have an active prescription for medications requested? Yes     Received Request Via: Pharmacy

## 2017-07-13 NOTE — TELEPHONE ENCOUNTER
lorazepam (ATIVAN) 1 MG Tab 60 Tab 3 refills 6/19/2017     Rx on 6/19 with 3 refills      Was the patient seen in the last year in this department? Yes     Does patient have an active prescription for medications requested? Yes     Received Request Via: Pharmacy

## 2017-07-14 RX ORDER — LORAZEPAM 1 MG/1
TABLET ORAL
Qty: 60 TAB | Refills: 0 | OUTPATIENT
Start: 2017-07-14

## 2017-08-07 ENCOUNTER — TELEPHONE (OUTPATIENT)
Dept: CARDIOLOGY | Facility: MEDICAL CENTER | Age: 77
End: 2017-08-07

## 2017-08-07 NOTE — TELEPHONE ENCOUNTER
----- Message from Mariangel Phipps sent at 8/7/2017  8:42 AM PDT -----  Regarding: needs orders faxed to [facility]      Patient went to lab for her draw and technician took her lab order for her imaging tests as well. She doesn't know what tests she needs to take, or when. She can be reached at [phone].

## 2017-08-07 NOTE — TELEPHONE ENCOUNTER
Dicussed pts pending lipid panel, echocardiogram and carotid ultrasound and answered pts questioned to clear confusion. Regarding the lipid panel, pt states she never started the Zetia prescribed by Dr. Jang because she couldn't afford it. Educated pt on the importance of following MDs advise and discussing other alternative more affordable medications. Pt declined stating she has been watching her cholesterol for several years with her PCP, Monica Angela, and her total cholesterol has come down significantly over the years without medication.She has made several changes to her diet and will stick to this.  Discussed the reasoning's for JI ordering the echo and carotid and went into detail about her previous carotid duplex showing mild stenosis. Pt agrees to these tests and requests the orders be mailed to her home.

## 2017-08-08 RX ORDER — LORAZEPAM 1 MG/1
TABLET ORAL
Refills: 0 | OUTPATIENT
Start: 2017-08-08

## 2017-08-11 ENCOUNTER — OFFICE VISIT (OUTPATIENT)
Dept: MEDICAL GROUP | Facility: PHYSICIAN GROUP | Age: 77
End: 2017-08-11
Payer: MEDICARE

## 2017-08-11 VITALS
OXYGEN SATURATION: 96 % | BODY MASS INDEX: 24.35 KG/M2 | HEIGHT: 60 IN | RESPIRATION RATE: 14 BRPM | HEART RATE: 72 BPM | TEMPERATURE: 97.9 F | DIASTOLIC BLOOD PRESSURE: 74 MMHG | WEIGHT: 124 LBS | SYSTOLIC BLOOD PRESSURE: 126 MMHG

## 2017-08-11 DIAGNOSIS — M65.351 TRIGGER LITTLE FINGER OF RIGHT HAND: ICD-10-CM

## 2017-08-11 DIAGNOSIS — E03.9 HYPOTHYROIDISM, UNSPECIFIED TYPE: ICD-10-CM

## 2017-08-11 DIAGNOSIS — I65.23 BILATERAL CAROTID ARTERY STENOSIS: ICD-10-CM

## 2017-08-11 DIAGNOSIS — I10 ESSENTIAL HYPERTENSION, BENIGN: ICD-10-CM

## 2017-08-11 DIAGNOSIS — F41.0 PANIC ATTACKS: ICD-10-CM

## 2017-08-11 DIAGNOSIS — S83.209A TORN MENISCUS: ICD-10-CM

## 2017-08-11 DIAGNOSIS — E78.5 DYSLIPIDEMIA: ICD-10-CM

## 2017-08-11 DIAGNOSIS — F32.9 REACTIVE DEPRESSION: ICD-10-CM

## 2017-08-11 DIAGNOSIS — I49.3 PVC (PREMATURE VENTRICULAR CONTRACTION): ICD-10-CM

## 2017-08-11 PROBLEM — I49.9 IRREGULAR HEARTBEAT: Status: RESOLVED | Noted: 2017-03-14 | Resolved: 2017-08-11

## 2017-08-11 PROCEDURE — 99214 OFFICE O/P EST MOD 30 MIN: CPT | Performed by: NURSE PRACTITIONER

## 2017-08-11 RX ORDER — FLUOXETINE 10 MG/1
5 CAPSULE ORAL DAILY
Qty: 30 CAP | Refills: 1 | Status: SHIPPED | OUTPATIENT
Start: 2017-08-11 | End: 2018-06-07

## 2017-08-11 NOTE — ASSESSMENT & PLAN NOTE
Patient went to Cardiology to be seen. Patient has benign htn, hyperlipidemia, anxiety and wanted to do a EKG and Carotid test.  Patient denies chest pain or shortness of breath.

## 2017-08-11 NOTE — ASSESSMENT & PLAN NOTE
Patient was offered Zetia to take from her cardiologist. Patient stated that she will not take the medication. Last Lipid profile was in March. Results for JU MEJIA (MRN [id number]) as of 8/11/2017 16:29   Ref. Range 3/20/2017 08:28 3/27/2017 14:00   Cholesterol,Tot Latest Ref Range: 100-199 mg/dL 221 (H)    Triglycerides Latest Ref Range: 0-149 mg/dL 147    HDL Latest Ref Range: >=40 mg/dL 54    LDL Latest Ref Range: <100 mg/dL 138 (H)

## 2017-08-11 NOTE — ASSESSMENT & PLAN NOTE
Depression Screen (PHQ-2/PHQ-9) 10/15/2014 3/18/2015 12/12/2016   PHQ-2 Total Score 0 6 -   PHQ-2 Total Score - - 3       Interpretation of PHQ-9 Total Score   Score Severity   1-4 No Depression   5-9 Mild Depression   10-14 Moderate Depression   15-19 Moderately Severe Depression   20-27 Severe Depression

## 2017-08-11 NOTE — MR AVS SNAPSHOT
"        Lolly Florence   2017 4:20 PM   Office Visit   MRN: [id number]    Department:  [facility]   Dept Phone:  [phone]    Description:  Female : 1940   Provider:  AURORA Alford           Reason for Visit     Results labs / several questions      Allergies as of 2017     Allergen Noted Reactions    Tramadol 2016   Shortness of Breath      You were diagnosed with     PVC (premature ventricular contraction)   [730041]       Dyslipidemia   [056752]       Panic attacks   [251774]       Reactive depression   [568210]       Trigger little finger of right hand   [561318]         Vital Signs     Blood Pressure Pulse Temperature Respirations Height Weight    126/74 mmHg 72 36.6 °C (97.9 °F) 14 1.514 m (4' 11.6\") 56.246 kg (124 lb)    Body Mass Index Oxygen Saturation Smoking Status             24.54 kg/m2 96% Never Smoker          Basic Information     Date Of Birth Sex Race Ethnicity Preferred Language    1940 Female White Non- English      Your appointments     Oct 20, 2017  1:00 PM   FOLLOW UP with Chemo Jang M.D.   [facility]    [address] [place] [address] [phone]              Problem List              ICD-10-CM Priority Class Noted - Resolved    Diverticular disease K57.90   5/15/2013 - Present    Panic attacks F41.0   5/15/2013 - Present    Depression F32.9   5/15/2013 - Present    Osteopenia M85.80   5/15/2013 - Present    Osteoarthritis M19.90   5/15/2013 - Present    Hypothyroidism E03.9   5/15/2013 - Present    Seborrheic keratosis    2013 - Present    Bradycardia R00.1   10/15/2014 - Present    GERD (gastroesophageal reflux disease) K21.9   3/18/2015 - Present    PVC (premature ventricular contraction) I49.3   2015 - Present    Carotid artery stenosis I65.29   2015 - Present    Gallstones K80.20   2015 - Present    Epigastric pain R10.13   2015 - " Present    Macular degeneration H35.30   2/23/2016 - Present    Left knee pain M25.562   7/5/2016 - Present    Torn meniscus S83.209A   12/12/2016 - Present    Essential hypertension, benign I10   6/20/2017 - Present    Dyslipidemia E78.5   6/20/2017 - Present    Trigger little finger of right hand M65.351   8/11/2017 - Present      Health Maintenance        Date Due Completion Dates    IMM DTaP/Tdap/Td Vaccine (1 - Tdap) 6/6/1957 ---    IMM ZOSTER VACCINE 6/6/1998 ---    IMM PNEUMOCOCCAL 65+ (ADULT) LOW/MEDIUM RISK SERIES (1 of 2 - PCV13) 6/6/2003 ---    MAMMOGRAM 5/15/2014 5/15/2013 (Declined)    Override on 5/15/2013: Patient Declined (Discussed with patient, declines)    IMM INFLUENZA (1) 10/2/2017 (Originally 9/1/2017) ---    COLONOSCOPY 1/15/2018 1/15/2013 (Done), 11/30/2012    Override on 1/15/2013: Done ([place] [facility])    BONE DENSITY 3/15/2018 3/15/2013 (Done)    Override on 3/15/2013: Done (Dr. Nestor PRIETO, [facility])            Current Immunizations     No immunizations on file.      Below and/or attached are the medications your provider expects you to take. Review all of your home medications and newly ordered medications with your provider and/or pharmacist. Follow medication instructions as directed by your provider and/or pharmacist. Please keep your medication list with you and share with your provider. Update the information when medications are discontinued, doses are changed, or new medications (including over-the-counter products) are added; and carry medication information at all times in the event of emergency situations     Allergies:  TRAMADOL - Shortness of Breath               Medications  Valid as of: August 11, 2017 -  4:53 PM    Generic Name Brand Name Tablet Size Instructions for use    Acetaminophen (Tab) TYLENOL 500 MG Take 500-1,000 mg by mouth every 6 hours as needed.        Cholecalciferol (Tab) Vitamin D3 2000 UNITS Take  by mouth.         DHA-Vitamin C-Lutein   Take  by mouth.        Diclofenac Sodium (Gel) Diclofenac Sodium 1 % Apply 4 g to skin as directed 3 times a day as needed.        Ezetimibe (Tab) ZETIA 10 MG Take 1 Tab by mouth every day.        FLUoxetine HCl (Cap) PROZAC 10 MG Take 0.5 Caps by mouth every day.        Glucosamine HCl   Take 1,200 mg by mouth.        Ibuprofen (Tab) MOTRIN 200 MG Take 200 mg by mouth every 6 hours as needed.        Levothyroxine Sodium (Tab) SYNTHROID 88 MCG TAKE 1 TABLET BY MOUTH EVERY MORNING ON AN EMPTY STOMACH        LORazepam (Tab) ATIVAN 1 MG TAKE 1 TABLET BY MOUTH EVERY 8 HOURS AS NEEDED FOR ANXIETY        Turmeric (Cap) Turmeric 450 MG Take  by mouth.        .                 Medicines prescribed today were sent to:     [facility] [address] [place] [address] - [address] [facility] [address] [place] [address] [address]    [address] [facility] [address] [facility] [address]    Phone: [phone] Fax: [phone]    Open 24 Hours?: No      Medication refill instructions:       If your prescription bottle indicates you have medication refills left, it is not necessary to call your provider’s office. Please contact your pharmacy and they will refill your medication.    If your prescription bottle indicates you do not have any refills left, you may request refills at any time through one of the following ways: The online [employer] system (except Urgent Care), by calling your provider’s office, or by asking your pharmacy to contact your provider’s office with a refill request. Medication refills are processed only during regular business hours and may not be available until the next business day. Your provider may request additional information or to have a follow-up visit with you prior to refilling your medication.   *Please Note: Medication refills are assigned a new Rx number when refilled electronically. Your pharmacy may indicate that no refills were authorized even though a new prescription for the same  medication is available at the pharmacy. Please request the medicine by name with the pharmacy before contacting your provider for a refill.           MyChart Status: Patient Declined

## 2017-08-11 NOTE — ASSESSMENT & PLAN NOTE
Patient continues to take Ativan 1 mg bid. There are days that she was taking a three times a day. Patient was counseled on the need to take Ativan 1 mg only bid. Patient had a good relationship with a neighbor. Patient had a good conversation.   Patient has been on 3 different anti-depressants. Patient had tried Prozac in the past, which was helpful, but then she felt numb to life's experiences. Patient has been on Ativan for 10 years. She would like to wean off.

## 2017-08-13 NOTE — ASSESSMENT & PLAN NOTE
Patient went to cardiology. Currently has carotid evaluation orders. She has not made the appointment. No falls. No dizziness.

## 2017-08-13 NOTE — ASSESSMENT & PLAN NOTE
"/74 mmHg  Pulse 72  Temp(Src) 36.6 °C (97.9 °F)  Resp 14  Ht 1.514 m (4' 11.6\")  Wt 56.246 kg (124 lb)  BMI 24.54 kg/m2  SpO2 96%  Periodic elevation of blood pressure related to stress. No medication for htn.   "

## 2017-08-13 NOTE — PROGRESS NOTES
Chief Complaint   Patient presents with   • Results     labs / several questions       HISTORY OF PRESENT ILLNESS: Patient is a @ age 77 here  today to discuss:     Interval history:     Hospitalizations NO     Injuries Ongoing knee problems.    Illness : no acute pain        PVC (premature ventricular contraction)  Patient went to Cardiology to be seen. Patient has benign htn, hyperlipidemia, anxiety and wanted to do a EKG and Carotid test.  Patient denies chest pain or shortness of breath.     Dyslipidemia  Patient was offered Zetia to take from her cardiologist. Patient stated that she will not take the medication. Last Lipid profile was in March. Results for JU MEJIA (MRN [id number]) as of 8/11/2017 16:29   Ref. Range 3/20/2017 08:28 3/27/2017 14:00   Cholesterol,Tot Latest Ref Range: 100-199 mg/dL 221 (H)    Triglycerides Latest Ref Range: 0-149 mg/dL 147    HDL Latest Ref Range: >=40 mg/dL 54    LDL Latest Ref Range: <100 mg/dL 138 (H)        Panic attacks  Patient continues to take Ativan 1 mg bid. There are days that she was taking a three times a day. Patient was counseled on the need to take Ativan 1 mg only bid. Patient had a good relationship with a neighbor. Patient had a good conversation. This was a good evening for her and she is encouraged to seek out more good conversations. Offered referral to PSYCH.   Patient has been on 3 different anti-depressants. Patient had tried Prozac in the past, which was helpful, but then she felt numb to life's experiences. Patient has been on Ativan for 10 years. She would like to wean off.     Depression  Depression Screen (PHQ-2/PHQ-9) 10/15/2014 3/18/2015 12/12/2016   PHQ-2 Total Score 0 6 -   PHQ-2 Total Score - - 3       Interpretation of PHQ-9 Total Score   Score Severity   1-4 No Depression   5-9 Mild Depression   10-14 Moderate Depression   15-19 Moderately Severe Depression   20-27 Severe Depression    Trigger little finger of right  "hand  Patient has trigger finger to right little finger. Offered options for treatments.     Essential hypertension, benign  /74 mmHg  Pulse 72  Temp(Src) 36.6 °C (97.9 °F)  Resp 14  Ht 1.514 m (4' 11.6\")  Wt 56.246 kg (124 lb)  BMI 24.54 kg/m2  SpO2 96%  Periodic elevation of blood pressure related to stress. No medication for htn.     Torn meniscus  Patient continues to have knee pain. She has had evaluation and is giving it time to heal No falls. No redness or heat to knee. Mild swelling.     Carotid artery stenosis  Patient went to cardiology. Currently has carotid evaluation orders. She has not made the appointment. No falls. No dizziness.     Hypothyroidism  Patient chronic controlled hypothyroidism. She takes 88 mcg daily. No new symptoms.         Allergies:Tramadol    Current Outpatient Prescriptions Ordered in [facility]   Medication Sig Dispense Refill   • fluoxetine (PROZAC) 10 MG Cap Take 0.5 Caps by mouth every day. 30 Cap 1   • lorazepam (ATIVAN) 1 MG Tab TAKE 1 TABLET BY MOUTH EVERY 8 HOURS AS NEEDED FOR ANXIETY 60 Tab 0   • levothyroxine (SYNTHROID) 88 MCG Tab TAKE 1 TABLET BY MOUTH EVERY MORNING ON AN EMPTY STOMACH 30 Tab 5   • ezetimibe (ZETIA) 10 MG Tab Take 1 Tab by mouth every day. 10 Tab 11   • DHA-Vitamin C-Lutein (EYE HEALTH FORMULA PO) Take  by mouth.     • Cholecalciferol (VITAMIN D3) 2000 UNITS Tab Take  by mouth.     • Turmeric 450 MG Cap Take  by mouth.     • GLUCOSAMINE HCL PO Take 1,200 mg by mouth.     • ibuprofen (MOTRIN) 200 MG Tab Take 200 mg by mouth every 6 hours as needed.     • Diclofenac Sodium 1 % Gel Apply 4 g to skin as directed 3 times a day as needed. 100 g 0   • acetaminophen (TYLENOL) 500 MG TABS Take 500-1,000 mg by mouth every 6 hours as needed.       No current Epic-ordered facility-administered medications on file.       Past Medical History   Diagnosis Date   • Panic attacks 5/15/2013   • Depression 5/15/2013   • Osteopenia    • Osteoarthritis 5/15/2013   • " "Hypothyroidism 5/15/2013   • Vertigo 10/15/2014   • Bradycardia 10/15/2014   • Hyperlipidemia 2014   • GERD (gastroesophageal reflux disease) 3/18/2015   • Hyperlipemia 3/18/2015   • Hypertension    • Hypothyroid        Social History   Substance Use Topics   • Smoking status: Never Smoker    • Smokeless tobacco: Never Used   • Alcohol Use: 0.0 oz/week     0 Standard drinks or equivalent per week      Comment: rare       Family Status   Relation Status Death Age   • Father     • Mother       Family History   Problem Relation Age of Onset   • Heart Disease Father    • Diabetes Father    • Arthritis Mother      RA       ROS: As documented in my HPI      Exam:  Blood pressure 126/74, pulse 72, temperature 36.6 °C (97.9 °F), resp. rate 14, height 1.514 m (4' 11.6\"), weight 56.246 kg (124 lb), SpO2 96 %.  General:  Well nourished, well developed female in NAD - tearful when discussing daughter.   Head: Nontender scalp. No lesions  Neck: Supple.   Pulmonary:  Normal effort. No rales, ronchi, or wheezing.  Cardiovascular: Regular rate and rhythm without murmur.   Extremities: no clubbing, cyanosis, or edema. Knee: slightly swollen. Pin point tenderness at MCL  Psych: Alert and oriented X 3   Neurological: No focal deficits    Please note that this dictation was created using voice recognition software. I have made every reasonable attempt to correct obvious errors, but I expect that there are errors of grammar and possibly content that I did not discover before finalizing the note.    Assessment/Plan:  1. PVC (premature ventricular contraction)   stable will follow with Cardiology   2. Dyslipidemia   not treated secondary to patients's wishes   3. Panic attacks  Current status of condition is chronic and controlled on therapy. Need to keep ativan at bid.    4. Reactive depression  Stabilized    5. Trigger little finger of right hand  Offered treatment - anti-inflammatories or referral to Orthopedics  "   6. Essential hypertension, benign  No treatment stable   7. Torn meniscus  Rehab and time   8. Bilateral carotid artery stenosis  Orders from cardiology    9. Hypothyroidism, unspecified type  Current status of condition is chronic and controlled on therapy.

## 2017-08-13 NOTE — ASSESSMENT & PLAN NOTE
Patient continues to have knee pain. She has had evaluation and is giving it time to heal No falls. No redness or heat to knee. Mild swelling.

## 2017-08-29 ENCOUNTER — APPOINTMENT (OUTPATIENT)
Dept: RADIOLOGY | Facility: IMAGING CENTER | Age: 77
End: 2017-08-29
Attending: PHYSICIAN ASSISTANT
Payer: MEDICARE

## 2017-08-29 ENCOUNTER — OFFICE VISIT (OUTPATIENT)
Dept: URGENT CARE | Facility: PHYSICIAN GROUP | Age: 77
End: 2017-08-29
Payer: MEDICARE

## 2017-08-29 VITALS
BODY MASS INDEX: 24.19 KG/M2 | OXYGEN SATURATION: 96 % | DIASTOLIC BLOOD PRESSURE: 82 MMHG | TEMPERATURE: 97.9 F | SYSTOLIC BLOOD PRESSURE: 120 MMHG | RESPIRATION RATE: 16 BRPM | HEIGHT: 60 IN | WEIGHT: 123.2 LBS | HEART RATE: 78 BPM

## 2017-08-29 DIAGNOSIS — R53.83 FATIGUE, UNSPECIFIED TYPE: ICD-10-CM

## 2017-08-29 DIAGNOSIS — F41.9 ANXIETY: ICD-10-CM

## 2017-08-29 DIAGNOSIS — R06.02 SOB (SHORTNESS OF BREATH): ICD-10-CM

## 2017-08-29 DIAGNOSIS — R42 DIZZY: ICD-10-CM

## 2017-08-29 LAB
APPEARANCE UR: CLEAR
BILIRUB UR STRIP-MCNC: NORMAL MG/DL
COLOR UR AUTO: YELLOW
GLUCOSE BLD-MCNC: 97 MG/DL (ref 70–100)
GLUCOSE UR STRIP.AUTO-MCNC: NORMAL MG/DL
KETONES UR STRIP.AUTO-MCNC: NORMAL MG/DL
LEUKOCYTE ESTERASE UR QL STRIP.AUTO: NORMAL
NITRITE UR QL STRIP.AUTO: NORMAL
PH UR STRIP.AUTO: 5 [PH] (ref 5–8)
PROT UR QL STRIP: NORMAL MG/DL
RBC UR QL AUTO: 5
SP GR UR STRIP.AUTO: 1
UROBILINOGEN UR STRIP-MCNC: NORMAL MG/DL

## 2017-08-29 PROCEDURE — 71020 DX-CHEST-2 VIEWS: CPT | Mod: TC | Performed by: PHYSICIAN ASSISTANT

## 2017-08-29 PROCEDURE — 82962 GLUCOSE BLOOD TEST: CPT | Performed by: PHYSICIAN ASSISTANT

## 2017-08-29 PROCEDURE — 99214 OFFICE O/P EST MOD 30 MIN: CPT | Performed by: PHYSICIAN ASSISTANT

## 2017-08-29 PROCEDURE — 81002 URINALYSIS NONAUTO W/O SCOPE: CPT | Performed by: PHYSICIAN ASSISTANT

## 2017-08-29 NOTE — PROGRESS NOTES
"Chief Complaint   Patient presents with   • Medication Problem     has been cutting back on her lorazepam, an adding prozac.  Feels like she does not have control of her body, dizzy.       HISTORY OF PRESENT ILLNESS: Patient is a 77 y.o. female who presents today for evaluation of multiple things. Patient appears to be quite down today and is overall not feeling well. She started taking 5 mg of Prozac on 8/16 and immediately felt poorly so she did not continue taking it. She has taken this medication in the past without any issues. She takes lorazepam daily ranging from 1 mg to one full milligram up to twice daily. Lately she's been taking one full milligram in the morning and one at night to help her sleep. Patient is having a difficult time balancing her medications stating, \"I'm afraid over do it but I am afraid to be anxious.\" She states, \"I am out of control and can't function with an anxiety attack.\" Patient lives alone with a dog and 2 cats in the property management recently changed where she lives. Patient states that she feels \"woozy\" but has had this off and on her entire life. Patient does report intermittent discomfort in her chest but states she has also had this off-and-on for the last several years. She denies any changes in her symptoms.     Patient Active Problem List    Diagnosis Date Noted   • Trigger little finger of right hand 08/11/2017   • Essential hypertension, benign 06/20/2017   • Dyslipidemia 06/20/2017   • Torn meniscus 12/12/2016   • Left knee pain 07/05/2016   • Macular degeneration 02/23/2016   • Gallstones 06/22/2015   • PVC (premature ventricular contraction) 04/17/2015   • Carotid artery stenosis 04/17/2015   • GERD (gastroesophageal reflux disease) 03/18/2015   • Seborrheic keratosis 07/16/2013   • Diverticular disease 05/15/2013   • Panic attacks 05/15/2013   • Depression 05/15/2013   • Osteopenia 05/15/2013   • Osteoarthritis 05/15/2013   • Hypothyroidism 05/15/2013 "       Allergies:Tramadol    Current Outpatient Prescriptions Ordered in [facility]   Medication Sig Dispense Refill   • lorazepam (ATIVAN) 1 MG Tab TAKE 1 TABLET BY MOUTH EVERY 8 HOURS AS NEEDED FOR ANXIETY 60 Tab 0   • DHA-Vitamin C-Lutein (EYE HEALTH FORMULA PO) Take  by mouth.     • acetaminophen (TYLENOL) 500 MG TABS Take 500-1,000 mg by mouth every 6 hours as needed.     • fluoxetine (PROZAC) 10 MG Cap Take 0.5 Caps by mouth every day. 30 Cap 1   • levothyroxine (SYNTHROID) 88 MCG Tab TAKE 1 TABLET BY MOUTH EVERY MORNING ON AN EMPTY STOMACH 30 Tab 5   • ezetimibe (ZETIA) 10 MG Tab Take 1 Tab by mouth every day. 10 Tab 11   • Cholecalciferol (VITAMIN D3) 2000 UNITS Tab Take  by mouth.     • Turmeric 450 MG Cap Take  by mouth.     • GLUCOSAMINE HCL PO Take 1,200 mg by mouth.     • ibuprofen (MOTRIN) 200 MG Tab Take 200 mg by mouth every 6 hours as needed.     • Diclofenac Sodium 1 % Gel Apply 4 g to skin as directed 3 times a day as needed. 100 g 0     No current Epic-ordered facility-administered medications on file.        Past Medical History:   Diagnosis Date   • GERD (gastroesophageal reflux disease) 3/18/2015   • Hyperlipemia 3/18/2015   • Hyperlipidemia 2014   • Vertigo 10/15/2014   • Bradycardia 10/15/2014   • Panic attacks 5/15/2013   • Depression 5/15/2013   • Osteoarthritis 5/15/2013   • Hypothyroidism 5/15/2013   • Hypertension    • Hypothyroid    • Osteopenia        Social History   Substance Use Topics   • Smoking status: Never Smoker   • Smokeless tobacco: Never Used   • Alcohol use 0.0 oz/week      Comment: rare       Family Status   Relation Status   • Father    • Mother      Family History   Problem Relation Age of Onset   • Heart Disease Father    • Diabetes Father    • Arthritis Mother      RA       ROS:   Review of Systems   Constitutional: Negative for fever, chills, weight loss and malaise/fatigue.   HENT: Negative for ear pain, nosebleeds, congestion, sore throat and neck  pain.    Eyes: Negative for blurred vision.   Respiratory: Negative for cough, sputum production, shortness of breath and wheezing.    Cardiovascular: Negative for chest pain, palpitations, orthopnea and leg swelling.   Gastrointestinal: Negative for heartburn, nausea, vomiting and abdominal pain.   Genitourinary: Negative for dysuria, urgency and frequency.       Exam:  Blood pressure 120/82, pulse 78, temperature 36.6 °C (97.9 °F), resp. rate 16, height 1.524 m (5'), weight 55.9 kg (123 lb 3.2 oz), SpO2 96 %.  General: Well developed, well nourished. No distress.  HEENT: Conjunctiva clear, lids without ptosis, PERRL/EOMI. Ears normal shape and contour, canals are clear bilaterally, tympanic membranes are benign. Nasal mucosa benign. Oropharynx is without erythema, edema or exudates. Reasonable dentition.  Neck: Trachea midline, no masses. No thyromegaly.  Pulmonary: Clear to ausculation and percussion.  Normal effort. No rales, ronchi, or wheezing.   Cardiovascular: Regular rate and rhythm without murmur. No edema.   Abdomen: Soft, non-tender, nondistended. No hepatosplenomegaly.   Neurologic: Grossly nonfocal.  Lymph: No cervical lymphadenopathy noted.  Extremities: No lower extremity edema noted.  Skin: Warm, dry, good turgor. No rashes in visible areas.   Psych: Normal mood. Alert and oriented x3. Judgment and insight is normal.    UA:   Component Value Ref Range & Units Status   POC Color yellow Negative Final   POC Appearance clear Negative Final   POC Leukocyte Esterase neg Negative Final   POC Nitrites neg Negative Final   POC Urobiligen neg Negative (0.2) mg/dL Final   POC Protein neg Negative mg/dL Final   POC Urine PH 5 5.0 - 8.0 Final   POC Blood 5.0 Negative Final   POC Specific Gravity 1.005 <1.005 - >1.030 Final   POC Ketones neg Negative mg/dL Final   POC Biliruben neg Negative mg/dL Final   POC Glucose neg Negative mg/dL Final     POCT glucose: 97    CXR, per radiology:    .   Impression       No  active disease. No interval change.     Assessment/Plan:  Discussed the patient her symptoms do not seem to be from an acute issue. This may be due to anxiety and/or depression. Referring patient to [facility] for further evaluation. Discussed ER precautions. Follow-up for any significant change in symptoms.  1. SOB (shortness of breath)  POCT glucose    POCT Urinalysis    DX-CHEST-2 VIEWS   2. Dizzy  POCT glucose    POCT Urinalysis    DX-CHEST-2 VIEWS   3. Fatigue, unspecified type  POCT Urinalysis    DX-CHEST-2 VIEWS   4. Anxiety  REFERRAL TO PSYCHIATRY

## 2017-09-20 ENCOUNTER — OFFICE VISIT (OUTPATIENT)
Dept: MEDICAL GROUP | Facility: PHYSICIAN GROUP | Age: 77
End: 2017-09-20
Payer: MEDICARE

## 2017-09-20 VITALS
TEMPERATURE: 97.2 F | DIASTOLIC BLOOD PRESSURE: 72 MMHG | WEIGHT: 117 LBS | BODY MASS INDEX: 22.97 KG/M2 | RESPIRATION RATE: 12 BRPM | OXYGEN SATURATION: 96 % | HEIGHT: 60 IN | HEART RATE: 52 BPM | SYSTOLIC BLOOD PRESSURE: 114 MMHG

## 2017-09-20 DIAGNOSIS — F41.0 PANIC ATTACKS: ICD-10-CM

## 2017-09-20 DIAGNOSIS — M17.9 OSTEOARTHRITIS OF KNEE, UNSPECIFIED LATERALITY, UNSPECIFIED OSTEOARTHRITIS TYPE: ICD-10-CM

## 2017-09-20 DIAGNOSIS — F32.9 REACTIVE DEPRESSION: ICD-10-CM

## 2017-09-20 PROCEDURE — 99214 OFFICE O/P EST MOD 30 MIN: CPT | Performed by: NURSE PRACTITIONER

## 2017-09-20 RX ORDER — FLUOXETINE 10 MG/1
TABLET, FILM COATED ORAL
Refills: 1 | COMMUNITY
Start: 2017-08-14 | End: 2018-06-07

## 2017-09-20 RX ORDER — LORAZEPAM 1 MG/1
TABLET ORAL
Qty: 60 TAB | Refills: 3 | Status: SHIPPED
Start: 2017-09-20 | End: 2018-04-08 | Stop reason: SDUPTHER

## 2017-09-20 RX ORDER — METHYLPREDNISOLONE 4 MG/1
TABLET ORAL
Refills: 0 | COMMUNITY
Start: 2017-09-15 | End: 2018-06-07

## 2017-09-20 NOTE — ASSESSMENT & PLAN NOTE
Patient has severe right knee pain. Was given oral prednisone. She states that the medication is helping and the inflammation is reduced. She is walking better and using a cane for stability.

## 2017-09-20 NOTE — ASSESSMENT & PLAN NOTE
Depression Screen (PHQ-2/PHQ-9) 10/15/2014 3/18/2015 12/12/2016   PHQ-2 Total Score 0 6 -   PHQ-2 Total Score - - 3     Depression Screening    Little interest or pleasure in doing things?   2   Feeling down, depressed , or hopeless?  2  Trouble falling or staying asleep, or sleeping too much?   1  Feeling tired or having little energy?   0  Poor appetite or overeating? 0    Feeling bad about yourself - or that you are a failure or have let yourself or your family down?  0  Trouble concentrating on things, such as reading the newspaper or watching television?  1  Moving or speaking so slowly that other people could have noticed.  Or the opposite - being so fidgety or restless that you have been moving around a lot more than usual?  0   Thoughts that you would be better off dead, or of hurting yourself?   0  Patient Health Questionnaire Score:    9    If depressive symptoms identified deferred to follow up visit unless specifically addressed in assesment and plan.    Interpretation of PHQ-9 Total Score   Score Severity   1-4 No Depression   5-9 Mild Depression   10-14 Moderate Depression   15-19 Moderately Severe Depression   20-27 Severe Depression

## 2017-09-20 NOTE — ASSESSMENT & PLAN NOTE
Patient now takes Ativan at a lower amount. She is taking the medication twice a day. She went off Prozac due to nausea and vomiting. Patient declined on referral for Mental Health. Patient would rather speak to her friends.

## 2017-09-20 NOTE — PROGRESS NOTES
Chief Complaint   Patient presents with   • Panic Attack     fv        HISTORY OF PRESENT ILLNESS: Patient is a @ age  77 here  today to discuss:     Interval history:     Hospitalizations : NO     Injuries  : right knee chronic pain     Illness  : no         Osteoarthritis  Patient has severe right knee pain. Was given oral prednisone. She states that the medication is helping and the inflammation is reduced. She is walking better and using a cane for stability.     Depression  Depression Screen (PHQ-2/PHQ-9) 10/15/2014 3/18/2015 12/12/2016   PHQ-2 Total Score 0 6 -   PHQ-2 Total Score - - 3     Depression Screening    Little interest or pleasure in doing things?   2   Feeling down, depressed , or hopeless?  2  Trouble falling or staying asleep, or sleeping too much?   1  Feeling tired or having little energy?   0  Poor appetite or overeating? 0    Feeling bad about yourself - or that you are a failure or have let yourself or your family down?  0  Trouble concentrating on things, such as reading the newspaper or watching television?  1  Moving or speaking so slowly that other people could have noticed.  Or the opposite - being so fidgety or restless that you have been moving around a lot more than usual?  0   Thoughts that you would be better off dead, or of hurting yourself?   0  Patient Health Questionnaire Score:    9    If depressive symptoms identified deferred to follow up visit unless specifically addressed in assesment and plan.    Interpretation of PHQ-9 Total Score   Score Severity   1-4 No Depression   5-9 Mild Depression   10-14 Moderate Depression   15-19 Moderately Severe Depression   20-27 Severe Depression    Panic attacks  Patient now takes Ativan at a lower amount. She is taking the medication twice a day. She went off Prozac due to nausea and vomiting. Patient declined on referral for Mental Health. Patient would rather speak to her friends.         Allergies:Tramadol and Prozac  [fluoxetine]    Current Outpatient Prescriptions Ordered in [facility]   Medication Sig Dispense Refill   • lorazepam (ATIVAN) 1 MG Tab Take one half tablet every 8 hours as needed for panic and anxiety. 60 Tab 3   • levothyroxine (SYNTHROID) 88 MCG Tab TAKE 1 TABLET BY MOUTH EVERY MORNING ON AN EMPTY STOMACH 30 Tab 5   • ezetimibe (ZETIA) 10 MG Tab Take 1 Tab by mouth every day. 10 Tab 11   • fluoxetine (PROZAC) 10 MG tablet TK 1/2 T PO QD  1   • MethylPREDNISolone (MEDROL DOSEPAK) 4 MG Tablet Therapy Pack TK UTD  0   • fluoxetine (PROZAC) 10 MG Cap Take 0.5 Caps by mouth every day. 30 Cap 1   • DHA-Vitamin C-Lutein (EYE HEALTH FORMULA PO) Take  by mouth.     • Cholecalciferol (VITAMIN D3) 2000 UNITS Tab Take  by mouth.     • Turmeric 450 MG Cap Take  by mouth.     • GLUCOSAMINE HCL PO Take 1,200 mg by mouth.     • ibuprofen (MOTRIN) 200 MG Tab Take 200 mg by mouth every 6 hours as needed.     • Diclofenac Sodium 1 % Gel Apply 4 g to skin as directed 3 times a day as needed. 100 g 0   • acetaminophen (TYLENOL) 500 MG TABS Take 500-1,000 mg by mouth every 6 hours as needed.       No current Epic-ordered facility-administered medications on file.        Past Medical History:   Diagnosis Date   • GERD (gastroesophageal reflux disease) 3/18/2015   • Hyperlipemia 3/18/2015   • Hyperlipidemia 2014   • Vertigo 10/15/2014   • Bradycardia 10/15/2014   • Panic attacks 5/15/2013   • Depression 5/15/2013   • Osteoarthritis 5/15/2013   • Hypothyroidism 5/15/2013   • Hypertension    • Hypothyroid    • Osteopenia        Social History   Substance Use Topics   • Smoking status: Never Smoker   • Smokeless tobacco: Never Used   • Alcohol use 0.0 oz/week      Comment: rare       Family Status   Relation Status   • Father    • Mother      Family History   Problem Relation Age of Onset   • Heart Disease Father    • Diabetes Father    • Arthritis Mother      RA       ROS: As documented in my HPI      Exam:  Blood pressure  114/72, pulse (!) 52, temperature 36.2 °C (97.2 °F), resp. rate 12, height 1.524 m (5'), weight 53.1 kg (117 lb), SpO2 96 %.  General:  Well nourished, well developed female in NAD  Head: Nontender scalp. No lesions  Neck: Supple. Symmetric Thyroid palpated. No bruits  Pulmonary:  Normal effort. No rales, ronchi, or wheezing.  Cardiovascular: Regular rate and rhythm without murmur.   Abdomen: Soft nontender, normal bowel sounds  Extremities: RIGHT KNEE: painful at joint. Swollen - no redness or heat  Psych: Alert and oriented x3.    Neurological: No focal deficits    Please note that this dictation was created using voice recognition software. I have made every reasonable attempt to correct obvious errors, but I expect that there are errors of grammar and possibly content that I did not discover before finalizing the note.    Assessment/Plan:  1. Osteoarthritis of knee, unspecified laterality, unspecified osteoarthritis type  NOT CONTROLLED   2. Reactive depression   Off PROZAC stable    3. Panic attacks   Current status of condition is chronic and controlled on therapy.

## 2017-11-27 ENCOUNTER — TELEPHONE (OUTPATIENT)
Dept: CARDIOLOGY | Facility: MEDICAL CENTER | Age: 77
End: 2017-11-27

## 2017-11-27 NOTE — TELEPHONE ENCOUNTER
----- Message from Diana Cunha sent at 11/27/2017 10:19 AM PST -----  Regarding: testing and dizziness  Contact: [phone]  SELENA/tae    Pt calling to ask what SELENA is looking for with the orders for carotid duplex and echo scheduled on 12/4 at [place].     Also, pt states she is continuing to have dizzy spells and wishes to discuss.   Please call pt at [phone]

## 2017-11-27 NOTE — TELEPHONE ENCOUNTER
S/w pt, education provided regarding the test that Dr Jang ordered (Echo and Carotid US), pt appreciative and verbalizes understanding.    Pt reports also that she continue to have dizzy spells, worse this AM and she feels unbalance, pt reports she does not take her BP regularly at home, the other day she reports that she has panic attack and she called EMT and her BP that time was 192/100, pt reports BP tends to go up when she have Panic attacks, advise pt if dizziness cont to be worse to seek emergency services, informed pt that will let Dr Jang about her concerns, pt verbalizes understanding.     To Dr Jang for advcie

## 2017-11-27 NOTE — TELEPHONE ENCOUNTER
Agree with plan. Recommend PCP review of panic attacks and dizziness as well. It sounds more vertigo related or due to her panic attacks. SC

## 2017-12-04 ENCOUNTER — OFFICE VISIT (OUTPATIENT)
Dept: URGENT CARE | Facility: PHYSICIAN GROUP | Age: 77
End: 2017-12-04
Payer: MEDICARE

## 2017-12-04 ENCOUNTER — HOSPITAL ENCOUNTER (EMERGENCY)
Facility: MEDICAL CENTER | Age: 77
End: 2017-12-04
Attending: EMERGENCY MEDICINE
Payer: MEDICARE

## 2017-12-04 VITALS
HEART RATE: 102 BPM | BODY MASS INDEX: 23.16 KG/M2 | SYSTOLIC BLOOD PRESSURE: 174 MMHG | WEIGHT: 118 LBS | DIASTOLIC BLOOD PRESSURE: 77 MMHG | OXYGEN SATURATION: 99 % | TEMPERATURE: 97.5 F | HEIGHT: 60 IN | RESPIRATION RATE: 18 BRPM

## 2017-12-04 VITALS
SYSTOLIC BLOOD PRESSURE: 128 MMHG | DIASTOLIC BLOOD PRESSURE: 80 MMHG | BODY MASS INDEX: 23.16 KG/M2 | TEMPERATURE: 97.4 F | WEIGHT: 118 LBS | HEIGHT: 60 IN | RESPIRATION RATE: 14 BRPM | OXYGEN SATURATION: 94 % | HEART RATE: 60 BPM

## 2017-12-04 DIAGNOSIS — F43.9 STRESS: ICD-10-CM

## 2017-12-04 DIAGNOSIS — F41.9 ANXIETY: ICD-10-CM

## 2017-12-04 DIAGNOSIS — R12 HEARTBURN: ICD-10-CM

## 2017-12-04 DIAGNOSIS — R42 VERTIGO: ICD-10-CM

## 2017-12-04 DIAGNOSIS — R42 DIZZINESS: ICD-10-CM

## 2017-12-04 DIAGNOSIS — M54.6 DISCOGENIC THORACIC PAIN: ICD-10-CM

## 2017-12-04 LAB
ALBUMIN SERPL BCP-MCNC: 3.9 G/DL (ref 3.2–4.9)
ALBUMIN/GLOB SERPL: 1.4 G/DL
ALP SERPL-CCNC: 68 U/L (ref 30–99)
ALT SERPL-CCNC: 10 U/L (ref 2–50)
ANION GAP SERPL CALC-SCNC: 8 MMOL/L (ref 0–11.9)
AST SERPL-CCNC: 16 U/L (ref 12–45)
BASOPHILS # BLD AUTO: 0.5 % (ref 0–1.8)
BASOPHILS # BLD: 0.06 K/UL (ref 0–0.12)
BILIRUB SERPL-MCNC: 0.6 MG/DL (ref 0.1–1.5)
BUN SERPL-MCNC: 9 MG/DL (ref 8–22)
CALCIUM SERPL-MCNC: 9.5 MG/DL (ref 8.5–10.5)
CHLORIDE SERPL-SCNC: 106 MMOL/L (ref 96–112)
CO2 SERPL-SCNC: 24 MMOL/L (ref 20–33)
CREAT SERPL-MCNC: 0.58 MG/DL (ref 0.5–1.4)
EKG IMPRESSION: NORMAL
EOSINOPHIL # BLD AUTO: 0.17 K/UL (ref 0–0.51)
EOSINOPHIL NFR BLD: 1.4 % (ref 0–6.9)
ERYTHROCYTE [DISTWIDTH] IN BLOOD BY AUTOMATED COUNT: 42 FL (ref 35.9–50)
GFR SERPL CREATININE-BSD FRML MDRD: >60 ML/MIN/1.73 M 2
GLOBULIN SER CALC-MCNC: 2.7 G/DL (ref 1.9–3.5)
GLUCOSE SERPL-MCNC: 74 MG/DL (ref 65–99)
HCT VFR BLD AUTO: 37.1 % (ref 37–47)
HGB BLD-MCNC: 12.7 G/DL (ref 12–16)
IMM GRANULOCYTES # BLD AUTO: 0.06 K/UL (ref 0–0.11)
IMM GRANULOCYTES NFR BLD AUTO: 0.5 % (ref 0–0.9)
LYMPHOCYTES # BLD AUTO: 1.88 K/UL (ref 1–4.8)
LYMPHOCYTES NFR BLD: 15.9 % (ref 22–41)
MCH RBC QN AUTO: 31.8 PG (ref 27–33)
MCHC RBC AUTO-ENTMCNC: 34.2 G/DL (ref 33.6–35)
MCV RBC AUTO: 93 FL (ref 81.4–97.8)
MONOCYTES # BLD AUTO: 0.9 K/UL (ref 0–0.85)
MONOCYTES NFR BLD AUTO: 7.6 % (ref 0–13.4)
NEUTROPHILS # BLD AUTO: 8.78 K/UL (ref 2–7.15)
NEUTROPHILS NFR BLD: 74.1 % (ref 44–72)
NRBC # BLD AUTO: 0 K/UL
NRBC BLD AUTO-RTO: 0 /100 WBC
PLATELET # BLD AUTO: 305 K/UL (ref 164–446)
PMV BLD AUTO: 10.1 FL (ref 9–12.9)
POTASSIUM SERPL-SCNC: 3.5 MMOL/L (ref 3.6–5.5)
PROT SERPL-MCNC: 6.6 G/DL (ref 6–8.2)
RBC # BLD AUTO: 3.99 M/UL (ref 4.2–5.4)
SODIUM SERPL-SCNC: 138 MMOL/L (ref 135–145)
WBC # BLD AUTO: 11.9 K/UL (ref 4.8–10.8)

## 2017-12-04 PROCEDURE — 85025 COMPLETE CBC W/AUTO DIFF WBC: CPT

## 2017-12-04 PROCEDURE — 700105 HCHG RX REV CODE 258: Performed by: EMERGENCY MEDICINE

## 2017-12-04 PROCEDURE — 36415 COLL VENOUS BLD VENIPUNCTURE: CPT

## 2017-12-04 PROCEDURE — A9270 NON-COVERED ITEM OR SERVICE: HCPCS | Performed by: EMERGENCY MEDICINE

## 2017-12-04 PROCEDURE — 93005 ELECTROCARDIOGRAM TRACING: CPT | Performed by: EMERGENCY MEDICINE

## 2017-12-04 PROCEDURE — 99215 OFFICE O/P EST HI 40 MIN: CPT | Performed by: PHYSICIAN ASSISTANT

## 2017-12-04 PROCEDURE — 700102 HCHG RX REV CODE 250 W/ 637 OVERRIDE(OP): Performed by: EMERGENCY MEDICINE

## 2017-12-04 PROCEDURE — 99284 EMERGENCY DEPT VISIT MOD MDM: CPT

## 2017-12-04 PROCEDURE — 80053 COMPREHEN METABOLIC PANEL: CPT

## 2017-12-04 RX ORDER — MECLIZINE HYDROCHLORIDE 25 MG/1
12.5 TABLET ORAL 3 TIMES DAILY PRN
Qty: 30 TAB | Refills: 0 | Status: SHIPPED | OUTPATIENT
Start: 2017-12-04 | End: 2018-06-07

## 2017-12-04 RX ORDER — MECLIZINE HYDROCHLORIDE 25 MG/1
25 TABLET ORAL ONCE
Status: COMPLETED | OUTPATIENT
Start: 2017-12-04 | End: 2017-12-04

## 2017-12-04 RX ORDER — LORAZEPAM 1 MG/1
0.5 TABLET ORAL ONCE
Status: COMPLETED | OUTPATIENT
Start: 2017-12-04 | End: 2017-12-04

## 2017-12-04 RX ORDER — SODIUM CHLORIDE 9 MG/ML
500 INJECTION, SOLUTION INTRAVENOUS ONCE
Status: COMPLETED | OUTPATIENT
Start: 2017-12-04 | End: 2017-12-04

## 2017-12-04 RX ADMIN — MECLIZINE HYDROCHLORIDE 25 MG: 25 TABLET ORAL at 15:15

## 2017-12-04 RX ADMIN — LORAZEPAM 0.5 MG: 1 TABLET ORAL at 15:15

## 2017-12-04 RX ADMIN — SODIUM CHLORIDE 500 ML: 9 INJECTION, SOLUTION INTRAVENOUS at 14:09

## 2017-12-04 ASSESSMENT — PAIN SCALES - GENERAL: PAINLEVEL_OUTOF10: 0

## 2017-12-04 NOTE — ED NOTES
Pt bib ems from home. Pt c/o dizziness since this morning. Pt was scheduled to have an echocardiogram this morning, canceled appointment, felt it was unsafe to drive. Pt did not feel better was told to come to ER. ekg showed trigeminy. Pt also c/o nausea. Pt received 4 mg Zofran iv and 324 mg asa po pta with no improvement. Pt alert and orientated x 4. Chart up for erp.

## 2017-12-04 NOTE — ED NOTES
Pt given discharge instructions and Rx x 1. Pt verbalized understanding. VSS no acute distress noted.

## 2017-12-04 NOTE — PROGRESS NOTES
Chief Complaint   Patient presents with   • Dizziness     Was scheduled for Carotid Duplex at [facility], felt too sick to go, Pt states [facility] advised she come to .        HISTORY OF PRESENT ILLNESS: Patient is a 77 y.o. female who presents today for the following:    The patient comes in for evaluation of dizziness and heartburn. Patient was scheduled to have a carotid ultrasound and echocardiogram was told to come to the urgent care when she told them she had dizziness. She has a history of both but states it is worse than normal. She typically can resolve her heartburn with gino tea but this has not been helping today. She states that she feels off balance. She denies chest pain, chest pressure, neck pain, jaw pain, and arm pain. She does complain of thoracic pain that is somewhat chronic but worse today. She has no personal or family history of MI or stroke. She does report being told she has an irregular heartbeat. She has never smoked and denies history of alcohol abuse and illicit drug use. She does report a high level of stress. She does not take aspirin on a regular basis.    Patient Active Problem List    Diagnosis Date Noted   • Trigger little finger of right hand 08/11/2017   • Essential hypertension, benign 06/20/2017   • Dyslipidemia 06/20/2017   • Torn meniscus 12/12/2016   • Left knee pain 07/05/2016   • Macular degeneration 02/23/2016   • Gallstones 06/22/2015   • PVC (premature ventricular contraction) 04/17/2015   • Carotid artery stenosis 04/17/2015   • GERD (gastroesophageal reflux disease) 03/18/2015   • Seborrheic keratosis 07/16/2013   • Diverticular disease 05/15/2013   • Panic attacks 05/15/2013   • Depression 05/15/2013   • Osteopenia 05/15/2013   • Osteoarthritis 05/15/2013   • Hypothyroidism 05/15/2013       Allergies:Tramadol and Prozac [fluoxetine]    Current Outpatient Prescriptions Ordered in [facility]   Medication Sig Dispense Refill   • levothyroxine (SYNTHROID) 88 MCG Tab TAKE 1  TABLET BY MOUTH EVERY MORNING ON AN EMPTY STOMACH 30 Tab 5   • ibuprofen (MOTRIN) 200 MG Tab Take 200 mg by mouth every 6 hours as needed.     • acetaminophen (TYLENOL) 500 MG TABS Take 500-1,000 mg by mouth every 6 hours as needed.     • fluoxetine (PROZAC) 10 MG tablet TK 1/2 T PO QD  1   • MethylPREDNISolone (MEDROL DOSEPAK) 4 MG Tablet Therapy Pack TK UTD  0   • lorazepam (ATIVAN) 1 MG Tab Take one half tablet every 8 hours as needed for panic and anxiety. 60 Tab 3   • fluoxetine (PROZAC) 10 MG Cap Take 0.5 Caps by mouth every day. 30 Cap 1   • ezetimibe (ZETIA) 10 MG Tab Take 1 Tab by mouth every day. 10 Tab 11   • DHA-Vitamin C-Lutein (EYE HEALTH FORMULA PO) Take  by mouth.     • Cholecalciferol (VITAMIN D3) 2000 UNITS Tab Take  by mouth.     • Turmeric 450 MG Cap Take  by mouth.     • GLUCOSAMINE HCL PO Take 1,200 mg by mouth.     • Diclofenac Sodium 1 % Gel Apply 4 g to skin as directed 3 times a day as needed. 100 g 0     No current Epic-ordered facility-administered medications on file.        Past Medical History:   Diagnosis Date   • Bradycardia 10/15/2014   • Depression 5/15/2013   • GERD (gastroesophageal reflux disease) 3/18/2015   • Hyperlipemia 3/18/2015   • Hyperlipidemia 2014   • Hypertension    • Hypothyroid    • Hypothyroidism 5/15/2013   • Osteoarthritis 5/15/2013   • Osteopenia    • Panic attacks 5/15/2013   • Vertigo 10/15/2014       Social History   Substance Use Topics   • Smoking status: Never Smoker   • Smokeless tobacco: Never Used   • Alcohol use 0.0 oz/week      Comment: rare       Family Status   Relation Status   • Father    • Mother      Family History   Problem Relation Age of Onset   • Heart Disease Father    • Diabetes Father    • Arthritis Mother      RA       Review of Systems:   Constitutional ROS: No weakness, No fatigue, No unexplained fevers, sweats, or chills  Eye ROS: No recent significant change in vision, No eye pain, redness, discharge  Ear ROS: No  drainage, No tinnitus or vertigo, No recent change in hearing  Mouth/Throat ROS: No bleeding gums, No tongue complaints, No sore throat  Neck ROS: No swollen glands, No recent swelling in thyroid area, No significant pain in neck  Pulmonary ROS: No wheezing, No shortness of breath, No recent change in breathing  Cardiovascular ROS: No chest pain, No diaphoresis, No edema, No syncope  Gastrointestinal ROS: No change in bowel habits, No significant change in appetite, No nausea, vomiting, diarrhea, or constipation  Musculoskeletal/Extremities ROS: No peripheral edema, No pain, redness or swelling on the joints  Hematologic/Lymphatic ROS: No chills, No bruising, No swollen nodes  Skin/Integumentary ROS: No edema, No evidence of rash, No itching  Neurologic ROS: No chronic headaches, No seizures, No weakness  Psychiatric ROS: Positive for anxiety    Exam:  Blood pressure 128/80, pulse 60, temperature 36.3 °C (97.4 °F), resp. rate 14, height 1.524 m (5'), weight 53.5 kg (118 lb), SpO2 94 %.  General: Well developed, well nourished. No distress.  HEENT: Conjunctiva clear, lids without ptosis, PERRL/EOMI. Ears normal shape and contour, canals are clear bilaterally, tympanic membranes are benign.   Neck: Trachea midline, no masses. No thyromegaly. No carotid bruits noted.  Pulmonary: Clear to ausculation and percussion.  Normal effort. No rales, ronchi, or wheezing.   Cardiovascular: Regular rate and rhythm without murmur.  Pedal pulses are strong and equal bilaterally.  Abdomen: Soft, non-tender, nondistended. No hepatosplenomegaly.   Neurologic: Grossly nonfocal.  Speech is clear and coherent.  Extremities: No lower extremity edema noted. No motor deficit noted.  Lymph: No cervical lymphadenopathy noted.  Skin: Warm, dry, good turgor. No rashes in visible areas.   Psych: Normal mood. Alert and oriented x3. Judgment and insight is normal.    EKG, per my interpretation: Normal sinus rhythm. PVC noted. No obvious ST  elevation or depression. EKG changes when compared to March 2017.    Aspirin 324 mg, chewable: given in clinic    Assessment/Plan:  Transferring patient to [facility] ED via ambulance.  1. Dizziness     2. Heartburn     3. Discogenic thoracic pain     4. Stress
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
## 2017-12-04 NOTE — DISCHARGE INSTRUCTIONS
Please follow-up with your primary care provider for blood pressure management.        Benign Positional Vertigo  Vertigo means you feel like you or your surroundings are moving when they are not. Benign positional vertigo is the most common form of vertigo. Benign means that the cause of your condition is not serious. Benign positional vertigo is more common in older adults.  CAUSES   Benign positional vertigo is the result of an upset in the labyrinth system. This is an area in the middle ear that helps control your balance. This may be caused by a viral infection, head injury, or repetitive motion. However, often no specific cause is found.  SYMPTOMS   Symptoms of benign positional vertigo occur when you move your head or eyes in different directions. Some of the symptoms may include:  · Loss of balance and falls.  · Vomiting.  · Blurred vision.  · Dizziness.  · Nausea.  · Involuntary eye movements (nystagmus).  DIAGNOSIS   Benign positional vertigo is usually diagnosed by physical exam. If the specific cause of your benign positional vertigo is unknown, your caregiver may perform imaging tests, such as magnetic resonance imaging (MRI) or computed tomography (CT).  TREATMENT   Your caregiver may recommend movements or procedures to correct the benign positional vertigo. Medicines such as meclizine, benzodiazepines, and medicines for nausea may be used to treat your symptoms. In rare cases, if your symptoms are caused by certain conditions that affect the inner ear, you may need surgery.  HOME CARE INSTRUCTIONS   · Follow your caregiver's instructions.  · Move slowly. Do not make sudden body or head movements.  · Avoid driving.  · Avoid operating heavy machinery.  · Avoid performing any tasks that would be dangerous to you or others during a vertigo episode.  · Drink enough fluids to keep your urine clear or pale yellow.  SEEK IMMEDIATE MEDICAL CARE IF:   · You develop problems with walking, weakness, numbness, or  using your arms, hands, or legs.  · You have difficulty speaking.  · You develop severe headaches.  · Your nausea or vomiting continues or gets worse.  · You develop visual changes.  · Your family or friends notice any behavioral changes.  · Your condition gets worse.  · You have a fever.  · You develop a stiff neck or sensitivity to light.  MAKE SURE YOU:   · Understand these instructions.  · Will watch your condition.  · Will get help right away if you are not doing well or get worse.     This information is not intended to replace advice given to you by your health care provider. Make sure you discuss any questions you have with your health care provider.     Document Released: 09/25/2007 Document Revised: 03/11/2013 Document Reviewed: 04/11/2016  [facility] Interactive Patient Education ©2016 [employer]

## 2017-12-05 ENCOUNTER — PATIENT OUTREACH (OUTPATIENT)
Dept: HEALTH INFORMATION MANAGEMENT | Facility: OTHER | Age: 77
End: 2017-12-05

## 2017-12-05 NOTE — ED NOTES
Pt will pay for her own taxi. Social work is helping pt to lobby. Rn is calling taxi for pt. Will discharge pt.

## 2017-12-05 NOTE — DISCHARGE PLANNING
Medical Social Work    SW spoke with pt at bedside and discussed her current transportation situation. Pt states she was BIB [facility] due to feeling dizzy and was told she cannot drive. She has her own car, which is how she gets around at home. Pt receives $2,000/mo in social security, she does not qualify for Medicaid. Pt does not have family in the area and nobody to come get her. SW explained to pt that she would be responsible for getting herself home, but SW and RN are willing to assist in calling a cab. Pt confirmed she does have a debit card and further proceeded to count out $43 in cash out of her wallet. Pt agrees she can pay for the ride. SW provided pt with water and crackers, per pt's request.    SW walked with pt to [facility] and pt requested she be able to sit somewhere else. SW walked pt to [facility] and pt expressed she was happy with this.  made aware of pt's location and that [employer] will be coming to get her, they will notify her when cab arrives.    RN aware.

## 2018-01-03 ENCOUNTER — OFFICE VISIT (OUTPATIENT)
Dept: MEDICAL GROUP | Facility: PHYSICIAN GROUP | Age: 78
End: 2018-01-03
Payer: MEDICARE

## 2018-01-03 VITALS
RESPIRATION RATE: 16 BRPM | OXYGEN SATURATION: 95 % | DIASTOLIC BLOOD PRESSURE: 72 MMHG | TEMPERATURE: 98.2 F | BODY MASS INDEX: 23.16 KG/M2 | SYSTOLIC BLOOD PRESSURE: 126 MMHG | WEIGHT: 118 LBS | HEIGHT: 60 IN | HEART RATE: 74 BPM

## 2018-01-03 DIAGNOSIS — E03.9 HYPOTHYROIDISM, UNSPECIFIED TYPE: ICD-10-CM

## 2018-01-03 DIAGNOSIS — K57.31 DIVERTICULOSIS OF LARGE INTESTINE WITH HEMORRHAGE: ICD-10-CM

## 2018-01-03 DIAGNOSIS — M17.9 OSTEOARTHRITIS OF KNEE, UNSPECIFIED LATERALITY, UNSPECIFIED OSTEOARTHRITIS TYPE: ICD-10-CM

## 2018-01-03 DIAGNOSIS — F32.9 REACTIVE DEPRESSION: ICD-10-CM

## 2018-01-03 DIAGNOSIS — I65.23 BILATERAL CAROTID ARTERY STENOSIS: ICD-10-CM

## 2018-01-03 DIAGNOSIS — I10 ESSENTIAL HYPERTENSION, BENIGN: ICD-10-CM

## 2018-01-03 DIAGNOSIS — F41.0 PANIC ATTACKS: ICD-10-CM

## 2018-01-03 DIAGNOSIS — L82.1 SEBORRHEIC KERATOSES: ICD-10-CM

## 2018-01-03 PROCEDURE — 99214 OFFICE O/P EST MOD 30 MIN: CPT | Performed by: NURSE PRACTITIONER

## 2018-01-03 NOTE — ASSESSMENT & PLAN NOTE
Patient is tired of the facial lesions she has and various warts that are present on her body. She is requesting a referral to [facility].

## 2018-01-03 NOTE — ASSESSMENT & PLAN NOTE
Depression Screening    Little interest or pleasure in doing things?   0  Feeling down, depressed , or hopeless?  1  Trouble falling or staying asleep, or sleeping too much?   0  Feeling tired or having little energy?   1  Poor appetite or overeating?   0  Feeling bad about yourself - or that you are a failure or have let yourself or your family down?  0  Trouble concentrating on things, such as reading the newspaper or watching television?  0  Moving or speaking so slowly that other people could have noticed.  Or the opposite - being so fidgety or restless that you have been moving around a lot more than usual?   1  Thoughts that you would be better off dead, or of hurting yourself?   0  Patient Health Questionnaire Score:  3    If depressive symptoms identified deferred to follow up visit unless specifically addressed in assessment and plan.    Interpretation of PHQ-9 Total Score   Score Severity   1-4 No Depression   5-9 Mild Depression   10-14 Moderate Depression   15-19 Moderately Severe Depression   20-27 Severe Depression

## 2018-01-03 NOTE — ASSESSMENT & PLAN NOTE
Patient has chronic panic attacks.  Patient is currently being treated with medication for an emotional disorder. Taking meds every day and denies worsening of sx, SI or HI. Advised on non-pharmaceutical means of controlling and dealing with emotions.

## 2018-01-03 NOTE — PROGRESS NOTES
Chief Complaint   Patient presents with   • Blood Pressure Problem   • Hospital Follow-up     [facility]       HISTORY OF PRESENT ILLNESS: Patient is a @ age 77 here  today to discuss:     Interval history:     Hospitalizations  YES - EMT = [facility]gent [facility] and finally to ER at [facility]. She stayed for one day and was released.     Injuries  No     Illness No         Diverticular disease  Patient continues to manage her bowels on her own. Patient was upset that when she was at the hospital and they did not attend her to her needs. No recent flares with diarrhea or constipation    Depression     Depression Screening    Little interest or pleasure in doing things?   0  Feeling down, depressed , or hopeless?  1  Trouble falling or staying asleep, or sleeping too much?   0  Feeling tired or having little energy?   1  Poor appetite or overeating?   0  Feeling bad about yourself - or that you are a failure or have let yourself or your family down?  0  Trouble concentrating on things, such as reading the newspaper or watching television?  0  Moving or speaking so slowly that other people could have noticed.  Or the opposite - being so fidgety or restless that you have been moving around a lot more than usual?   1  Thoughts that you would be better off dead, or of hurting yourself?   0  Patient Health Questionnaire Score:  3    If depressive symptoms identified deferred to follow up visit unless specifically addressed in assessment and plan.    Interpretation of PHQ-9 Total Score   Score Severity   1-4 No Depression   5-9 Mild Depression   10-14 Moderate Depression   15-19 Moderately Severe Depression   20-27 Severe Depression      Patient went off her PROZAC. Did not want to stay on it as it was helping with her panic and anxiety. Patient does take Ativan 1 mg. She will take a half of a tablet at night for sleep and sometimes a whole tablet for panic. When she was in hospital she took a whole tablet.      Carotid artery  stenosis  Patient missed her appointment for carotids. She is to find a new Cardiologist and has names to call. The carotid ultrasound exam order has . I have agreed to write for a new one.     Panic attacks  Patient has chronic panic attacks.  Patient is currently being treated with medication for an emotional disorder. Taking meds every day and denies worsening of sx, SI or HI. Advised on non-pharmaceutical means of controlling and dealing with emotions.    Osteoarthritis  Patient no longer takes medication for pain management. She will take tylenol or advil as needed. She is taking a benzo and understands she cannot take both for her medications.     Hypothyroidism  Patient is know to be hypothyroid. She does take Levothyroxine 88 mcg daily. Need to update her labs.     Seborrheic keratoses  Patient is tired of the facial lesions she has and various warts that are present on her body. She is requesting a referral to DERM.     Essential hypertension, benign  Patient has labile blood pressure. Patient's blood pressure seems to bounce around depending on her anxiety level. She currently is not on any hypertensive. Currently denies chest pain, shortness of breath peripheral edema visual changes she does have periodic bouts of dizziness may be related to her carotids.        Allergies:Tramadol and Prozac [fluoxetine]    Current Outpatient Prescriptions Ordered in [facility]   Medication Sig Dispense Refill   • lorazepam (ATIVAN) 1 MG Tab Take one half tablet every 8 hours as needed for panic and anxiety. 60 Tab 3   • levothyroxine (SYNTHROID) 88 MCG Tab TAKE 1 TABLET BY MOUTH EVERY MORNING ON AN EMPTY STOMACH 30 Tab 5   • DHA-Vitamin C-Lutein (EYE HEALTH FORMULA PO) Take  by mouth.     • meclizine (ANTIVERT) 25 MG Tab Take 0.5 Tabs by mouth 3 times a day as needed for Vertigo. 30 Tab 0   • fluoxetine (PROZAC) 10 MG tablet TK 1/2 T PO QD  1   • MethylPREDNISolone (MEDROL DOSEPAK) 4 MG Tablet Therapy Pack TK UTD  0   •  fluoxetine (PROZAC) 10 MG Cap Take 0.5 Caps by mouth every day. 30 Cap 1   • ezetimibe (ZETIA) 10 MG Tab Take 1 Tab by mouth every day. 10 Tab 11   • Cholecalciferol (VITAMIN D3) 2000 UNITS Tab Take  by mouth.     • Turmeric 450 MG Cap Take  by mouth.     • GLUCOSAMINE HCL PO Take 1,200 mg by mouth.     • ibuprofen (MOTRIN) 200 MG Tab Take 200 mg by mouth every 6 hours as needed.     • Diclofenac Sodium 1 % Gel Apply 4 g to skin as directed 3 times a day as needed. 100 g 0   • acetaminophen (TYLENOL) 500 MG TABS Take 500-1,000 mg by mouth every 6 hours as needed.       No current Epic-ordered facility-administered medications on file.        Past Medical History:   Diagnosis Date   • Bradycardia 10/15/2014   • Depression 5/15/2013   • GERD (gastroesophageal reflux disease) 3/18/2015   • Hyperlipemia 3/18/2015   • Hyperlipidemia 2014   • Hypertension    • Hypothyroid    • Hypothyroidism 5/15/2013   • Osteoarthritis 5/15/2013   • Osteopenia    • Panic attacks 5/15/2013   • Vertigo 10/15/2014       Social History   Substance Use Topics   • Smoking status: Never Smoker   • Smokeless tobacco: Never Used   • Alcohol use 0.0 oz/week      Comment: rare       Family Status   Relation Status   • Father    • Mother      Family History   Problem Relation Age of Onset   • Heart Disease Father    • Diabetes Father    • Arthritis Mother      RA       ROS: As documented in my HPI      Exam:  Blood pressure 126/72, pulse 74, temperature 36.8 °C (98.2 °F), resp. rate 16, height 1.524 m (5'), weight 53.5 kg (118 lb), SpO2 95 %.  General:  Well nourished, well developed female in NAD  Quiet spoken today  Head: Nontender scalp. No lesions  Neck: Supple. Symmetric Thyroid palpated. No bruits  Carotids: No bruits heard  Pulmonary:  Normal effort. No rales, ronchi, or wheezing.  Cardiovascular: Regular rate and rhythm without murmur.   Abdomen: Soft nontender, normal bowel sounds  Extremities: no clubbing, cyanosis, or  edema.  Psych: Alert and oriented x3.  Neurological: No focal deficits    Please note that this dictation was created using voice recognition software. I have made every reasonable attempt to correct obvious errors, but I expect that there are errors of grammar and possibly content that I did not discover before finalizing the note.    Assessment/Plan:  1. Diverticulosis of large intestine with hemorrhage   stable    2. Reactive depression  LIPID PROFILE    TSH+FREE T4   3. Bilateral carotid artery stenosis  LIPID PROFILE    US-CAROTID DOPPLER   4. Panic attacks   Current status of condition is chronic and controlled on therapy.  On ativan as prescribed   5. Osteoarthritis of knee, unspecified laterality, unspecified osteoarthritis type   stable no narcotics for pain management   6. Hypothyroidism, unspecified type  Will obtain labs   7. Seborrheic keratoses  REF to derm   8. Essential hypertension, benign  Stable without medications.

## 2018-01-03 NOTE — ASSESSMENT & PLAN NOTE
Patient continues to manage her bowels on her own. Patient was upset that when she was at the hospital and they did not attend her to her needs.

## 2018-01-03 NOTE — ASSESSMENT & PLAN NOTE
Patient is know to be hypothyroid. She does take Levothyroxine 88 mcg daily. Need to update her labs.

## 2018-01-16 RX ORDER — LEVOTHYROXINE SODIUM 88 UG/1
88 TABLET ORAL EVERY MORNING
Qty: 30 TAB | Refills: 2 | Status: SHIPPED | OUTPATIENT
Start: 2018-01-16 | End: 2018-06-07 | Stop reason: SDUPTHER

## 2018-01-16 NOTE — TELEPHONE ENCOUNTER
Was the patient seen in the last year in this department? Yes     Does patient have an active prescription for medications requested? No     Received Request Via: Pharmacy      Pt met protocol?: Yes    OV 1/18  TSH 1/18

## 2018-01-16 NOTE — TELEPHONE ENCOUNTER
Was the patient seen in the last year in this department? Yes     Does patient have an active prescription for medications requested? No     Received Request Via: Pharmacy      Pt met protocol?: Yes    LAST OV 01/03/2018      Lab Results  Component Value Date/Time   TSHULTRASEN 1.050 03/20/2017 0828

## 2018-01-16 NOTE — TELEPHONE ENCOUNTER
Patient was last seen by PCP 1/18. Last TSH read 3/17 (1.05). Will refill for 3 months. New labs pending.

## 2018-05-24 ENCOUNTER — TELEPHONE (OUTPATIENT)
Dept: MEDICAL GROUP | Facility: PHYSICIAN GROUP | Age: 78
End: 2018-05-24

## 2018-05-24 NOTE — TELEPHONE ENCOUNTER
----- Message from Kristine Schultz, Med Ass't sent at 5/21/2018 10:31 AM PDT -----  Regarding: med refill   Previous pt of devyn asher Pt needs a a refill of her ativan and she has an upcoming appt with darby zendejas on the 6/7 but states that she will run out of that medication prior to that appt

## 2018-05-24 NOTE — TELEPHONE ENCOUNTER
Called [phone] (home) trying to speak with patient. Patient advises that she will call back when she gets into a better service area. Phone cutting in and out.

## 2018-05-24 NOTE — TELEPHONE ENCOUNTER
Ativan prescription prescribed from CM Start Date 4/11/18 End Date 6/10/18. Patients next appointment 6/7/18 with SK.

## 2018-05-28 ENCOUNTER — OFFICE VISIT (OUTPATIENT)
Dept: URGENT CARE | Facility: PHYSICIAN GROUP | Age: 78
End: 2018-05-28
Payer: MEDICARE

## 2018-05-28 VITALS
SYSTOLIC BLOOD PRESSURE: 184 MMHG | OXYGEN SATURATION: 97 % | DIASTOLIC BLOOD PRESSURE: 86 MMHG | RESPIRATION RATE: 14 BRPM | HEART RATE: 79 BPM | HEIGHT: 60 IN | WEIGHT: 118 LBS | BODY MASS INDEX: 23.16 KG/M2 | TEMPERATURE: 98.7 F

## 2018-05-28 DIAGNOSIS — I15.9 SECONDARY HYPERTENSION: ICD-10-CM

## 2018-05-28 DIAGNOSIS — R42 DIZZINESS: ICD-10-CM

## 2018-05-28 PROCEDURE — 99214 OFFICE O/P EST MOD 30 MIN: CPT | Performed by: NURSE PRACTITIONER

## 2018-05-28 PROCEDURE — 93000 ELECTROCARDIOGRAM COMPLETE: CPT | Performed by: NURSE PRACTITIONER

## 2018-05-28 ASSESSMENT — ENCOUNTER SYMPTOMS
BLOOD IN STOOL: 0
FOCAL WEAKNESS: 0
PALPITATIONS: 1
SHORTNESS OF BREATH: 0
SENSORY CHANGE: 0
VOMITING: 0
HEARTBURN: 1
COUGH: 1
NAUSEA: 1
FEVER: 0
EYES NEGATIVE: 1
DIZZINESS: 1
HEADACHES: 0
DIARRHEA: 0

## 2018-05-28 NOTE — PROGRESS NOTES
Subjective:      Lolly Florence is a 77 y.o. female who presents with Blood Pressure Problem and Headache            HPI     Pt c/o high BP at home 193/92 192 systolic  No cp no sob  Mild dizziness--> most of the time, states all her life  Had a fall 2 weeks ago--> mechanical fall--> no ko  Pt has hiatal hernia so has nausea PRN    Has GERD--> gaviscon, soda cracker and 7-up helps  No UTI symptoms     Pt had similar symptoms last night  Pt states paramedics came to her home last night and told to go to ER and pt declined  Pt was told he heart was beating irregularly  States cardiology wants pt to do some test but she has not --> 1 year ago      meds take for BP--> none states she tried several and they done work for her  Pt stopped her own medication    HA from lorazepam  Pt asking for ativan    Has appointment with darby zendejas 6/7/2018--> new primary    Review of Systems   Constitutional: Negative for fever.   HENT: Negative.    Eyes: Negative.    Respiratory: Positive for cough. Negative for shortness of breath.    Cardiovascular: Positive for palpitations. Negative for chest pain and leg swelling.   Gastrointestinal: Positive for heartburn and nausea. Negative for blood in stool, diarrhea and vomiting.   Skin: Negative for rash.   Neurological: Positive for dizziness. Negative for sensory change, focal weakness and headaches.   Endo/Heme/Allergies: Negative.           Objective:     BP (!) 180/88   Pulse 79   Temp 37.1 °C (98.7 °F)   Resp 14   Ht 1.524 m (5')   Wt 53.5 kg (118 lb)   SpO2 97%   BMI 23.05 kg/m²      Physical Exam   Constitutional: She is oriented to person, place, and time. She appears well-developed and well-nourished.   HENT:   Head: Normocephalic and atraumatic.   Right Ear: External ear normal.   Left Ear: External ear normal.   Nose: Nose normal.   Mouth/Throat: Oropharynx is clear and moist.   Eyes: Conjunctivae and EOM are normal. Pupils are equal, round, and reactive to  light.   Neck: Normal range of motion. Neck supple.   Cardiovascular: Intact distal pulses.    Regular rhythm with a few skipped beats auscultated     Pulmonary/Chest: Effort normal and breath sounds normal.   Abdominal: Soft. Bowel sounds are normal.   Musculoskeletal: Normal range of motion.   Neurological: She is alert and oriented to person, place, and time. No cranial nerve deficit or sensory deficit. GCS eye subscore is 4. GCS verbal subscore is 5. GCS motor subscore is 6.   Patient awake and alert and oriented ×3  Patient does walk with  Quad cane and not always steadily  Patient has equal facial symmetry  No facial droop  Tongue midline  Equal light touch sensation to face arms and legs  No dysmetria noted on finger to nose       Skin: Skin is warm. Capillary refill takes less than 2 seconds.   Psychiatric: She has a normal mood and affect. Her behavior is normal. Judgment and thought content normal.               Assessment/Plan:     1. Secondary hypertension  EKG - Clinic Performed    UC AMA/Refusal of Treatment   2. Dizziness  UC AMA/Refusal of Treatment     EKG done sinus rhythm with ventricular premature complexes. Similar to previous  Given patient's symptoms suggest the patient go to ER via 911  Patient declined calling ambulance.  Patient states she will call her neighbor to come and pick her up and take her to the ER  Long discussion with patient as to why ER should be appropriate, patient is nervous due to having animals at home and did not like it last time she went to the ER.  Patient signed AMA as once again declined during to the emergency department for further workup at this time  Discussed risks of death heart attack or stroke patient does not follow up in the ER based on symptoms  Patient also needs to keep her appointment with new PCP

## 2018-06-07 ENCOUNTER — OFFICE VISIT (OUTPATIENT)
Dept: MEDICAL GROUP | Facility: PHYSICIAN GROUP | Age: 78
End: 2018-06-07
Payer: MEDICARE

## 2018-06-07 VITALS
BODY MASS INDEX: 22.58 KG/M2 | SYSTOLIC BLOOD PRESSURE: 118 MMHG | HEIGHT: 60 IN | DIASTOLIC BLOOD PRESSURE: 70 MMHG | RESPIRATION RATE: 16 BRPM | OXYGEN SATURATION: 97 % | WEIGHT: 115 LBS | HEART RATE: 86 BPM | TEMPERATURE: 97 F

## 2018-06-07 DIAGNOSIS — F41.9 ANXIETY: ICD-10-CM

## 2018-06-07 DIAGNOSIS — E03.9 HYPOTHYROIDISM, UNSPECIFIED TYPE: ICD-10-CM

## 2018-06-07 DIAGNOSIS — K21.9 GASTROESOPHAGEAL REFLUX DISEASE WITHOUT ESOPHAGITIS: ICD-10-CM

## 2018-06-07 DIAGNOSIS — F41.0 PANIC ATTACKS: ICD-10-CM

## 2018-06-07 PROCEDURE — 99214 OFFICE O/P EST MOD 30 MIN: CPT | Performed by: NURSE PRACTITIONER

## 2018-06-07 RX ORDER — LEVOTHYROXINE SODIUM 88 UG/1
88 TABLET ORAL EVERY MORNING
Qty: 90 TAB | Refills: 3 | Status: SHIPPED | OUTPATIENT
Start: 2018-06-07 | End: 2018-07-03 | Stop reason: SDUPTHER

## 2018-06-07 RX ORDER — LORAZEPAM 1 MG/1
0.5 TABLET ORAL EVERY 8 HOURS PRN
Qty: 60 TAB | Refills: 1 | Status: SHIPPED
Start: 2018-06-07 | End: 2018-07-03

## 2018-06-07 RX ORDER — PANTOPRAZOLE SODIUM 40 MG/1
40 TABLET, DELAYED RELEASE ORAL DAILY
COMMUNITY
End: 2018-07-03

## 2018-06-07 ASSESSMENT — PATIENT HEALTH QUESTIONNAIRE - PHQ9: CLINICAL INTERPRETATION OF PHQ2 SCORE: 0

## 2018-06-07 NOTE — ASSESSMENT & PLAN NOTE
"This is chronic, has been going on for several years. Started a long time ago when she was admitted to hospital for what she thought was a heart attack, but was diagnosed with panic attack.  She is only on Lorazepam 0.5-1 mg 3 times a day as needed for panic attacks.  She is quite adamant about not starting any other medication because she states \"most medications do not agree with me and they upset my stomach.\"  She reports to being on this medication for years and does not want to change.  She does understand the risks associated with ongoing benzodiazepine use and she does agree to see me every 3 months for refills.  The [facility] was reviewed and she is taking appropriately and there is no evidence of aberrant behavior.  "

## 2018-06-07 NOTE — PROGRESS NOTES
"Chief Complaint   Patient presents with   • Hypothyroidism     refill lorazepam, levothyroxine         This is a 78 y.o.female patient that presents today with the following: Establish care with new PCP, medication refills    Panic attacks  This is chronic, has been going on for several years. Started a long time ago when she was admitted to hospital for what she thought was a heart attack, but was diagnosed with panic attack.  She is only on Lorazepam 0.5-1 mg 3 times a day as needed for panic attacks.  She is quite adamant about not starting any other medication because she states \"most medications do not agree with me and they upset my stomach.\"  She reports to being on this medication for years and does not want to change.  She does understand the risks associated with ongoing benzodiazepine use and she does agree to see me every 3 months for refills.  The [facility] was reviewed and she is taking appropriately and there is no evidence of aberrant behavior.    GERD (gastroesophageal reflux disease)  This is a chronic condition, stable fairly well controlled on Protonix 40 mg daily.  She recently started this medication.  She reports to be good at avoiding aggravating foods and activities.  However she did not know that this was to be taken on an empty stomach first thing in the morning, she will start doing this.    Hypothyroidism  This is a chronic condition, stable and fairly well controlled on current medications including levothyroxine 80 mcg daily.  She is past due for labs, these have been ordered by her past PCP and she can have them done anytime in the next couple of days.  She does need refills, this was called in for her.  She does deny symptoms of hypothyroidism and does understand to take this on an empty stomach first thing in the morning apart from her other medications.      No visits with results within 1 Month(s) from this visit.   Latest known visit with results is:   Admission on 12/04/2017, " Discharged on 2017   Component Date Value   • Report 2017                      Value:[facility]    Test Date:  2017  Pt Name:    JU MEJIA           Department: ER  MRN:        [id number]                      Room:        37  Gender:     F                            Technician: [id number]  :        [id number]                   Requested By:ER TRIAGE PROTOCOL  Order #:    [id number]                    Reading MD: ESTELITA SANTORO MD    Measurements  Intervals                                Axis  Rate:       74                           P:          39  AR:         148                          QRS:        6  QRSD:       76                           T:          -6  QT:         388  QTc:        431    Interpretive Statements  SINUS RHYTHM  MULTIPLE VENTRICULAR PREMATURE COMPLEXES  BORDERLINE T ABNORMALITIES, INFERIOR LEADS  Compared to ECG 2007 12:39:57  Ventricular premature complex(es) now present  T-wave abnormality now present    Electronically Signed On 2017 14:53:06 PST by ESTELITA SANTORO MD     • WBC 2017 11.9*   • RBC 2017 3.99*   • Hemoglobin 2017 12.7    • Hematocrit 2017 37.1    • MCV 2017 93.0    • MCH 2017 31.8    • MCHC 2017 34.2    • RDW 2017 42.0    • Platelet Count 2017 305    • MPV 2017 10.1    • Neutrophils-Polys 2017 74.10*   • Lymphocytes 2017 15.90*   • Monocytes 2017 7.60    • Eosinophils 2017 1.40    • Basophils 2017 0.50    • Immature Granulocytes 2017 0.50    • Nucleated RBC 2017 0.00    • Neutrophils (Absolute) 2017 8.78*   • Lymphs (Absolute) 2017 1.88    • Monos (Absolute) 2017 0.90*   • Eos (Absolute) 2017 0.17    • Baso (Absolute) 2017 0.06    • Immature Granulocytes (a* 2017 0.06    • NRBC (Absolute) 2017 0.00    • Sodium 2017 138    • Potassium 2017 3.5*   •  Chloride 12/04/2017 106    • Co2 12/04/2017 24    • Anion Gap 12/04/2017 8.0    • Glucose 12/04/2017 74    • Bun 12/04/2017 9    • Creatinine 12/04/2017 0.58    • Calcium 12/04/2017 9.5    • AST(SGOT) 12/04/2017 16    • ALT(SGPT) 12/04/2017 10    • Alkaline Phosphatase 12/04/2017 68    • Total Bilirubin 12/04/2017 0.6    • Albumin 12/04/2017 3.9    • Total Protein 12/04/2017 6.6    • Globulin 12/04/2017 2.7    • A-G Ratio 12/04/2017 1.4    • GFR If  12/04/2017 >60    • GFR If Non  Ameri* 12/04/2017 >60          clinical course has been stable    Past Medical History:   Diagnosis Date   • Bradycardia 10/15/2014   • Depression 5/15/2013   • GERD (gastroesophageal reflux disease) 3/18/2015   • Hyperlipemia 3/18/2015   • Hyperlipidemia 12/2/2014   • Hypertension    • Hypothyroid    • Hypothyroidism 5/15/2013   • Osteoarthritis 5/15/2013   • Osteopenia    • Panic attacks 5/15/2013   • Vertigo 10/15/2014       Past Surgical History:   Procedure Laterality Date   • HIATAL HERNIA REPAIR  2007       Family History   Problem Relation Age of Onset   • Heart Disease Father    • Diabetes Father    • Arthritis Mother      RA       Tramadol and Prozac [fluoxetine]    Current Outpatient Prescriptions Ordered in [facility]   Medication Sig Dispense Refill   • pantoprazole (PROTONIX) 40 MG Tablet Delayed Response Take 40 mg by mouth every day.     • LORazepam (ATIVAN) 1 MG Tab Take 0.5 Tabs by mouth every 8 hours as needed for Anxiety for up to 90 days. 60 Tab 1   • levothyroxine (SYNTHROID) 88 MCG Tab Take 1 Tab by mouth every morning. ON AN EMPTY STOMACH 90 Tab 3   • DHA-Vitamin C-Lutein (EYE HEALTH FORMULA PO) Take  by mouth.     • GLUCOSAMINE HCL PO Take 1,200 mg by mouth.     • ibuprofen (MOTRIN) 200 MG Tab Take 200 mg by mouth every 6 hours as needed.     • acetaminophen (TYLENOL) 500 MG TABS Take 500-1,000 mg by mouth every 6 hours as needed.       No current Epic-ordered facility-administered medications on  file.        Constitutional ROS: No unexpected change in weight, No weakness, No unexplained fevers, sweats, or chills  Pulmonary ROS: No chronic cough, sputum, or hemoptysis, No shortness of breath, No recent change in breathing  Cardiovascular ROS: No chest pain, No edema, No palpitations  Gastrointestinal ROS: No abdominal pain, No nausea, vomiting, diarrhea, or constipation, Positive for reflux  Musculoskeletal/Extremities ROS: positive for back pain  Neurologic ROS: Normal development, No seizures, No weakness  Psychiatric ROS: positive per HPI    Physical exam:  /70   Pulse 86   Temp 36.1 °C (97 °F)   Resp 16   Ht 1.524 m (5')   Wt 52.2 kg (115 lb)   SpO2 97%   BMI 22.46 kg/m²   General Appearance: elderly female, alert, no distress, well nourished, well groomed  Skin: Skin color, texture, turgor normal. No rashes or lesions.  Lungs: negative findings: normal respiratory rate and rhythm, lungs clear to auscultation  Heart: negative. RRR without murmur, gallop, or rubs.  No ectopy.  Abdomen: Abdomen soft, non-tender. BS normal. No masses,  No organomegaly  Musculoskeletal: negative findings: no evidence of joint instability, strength normal, no deformities present  Neurologic: intact, CN 2-12 grossly intact    Medical decision making/discussion: ativan refilled and she is to follow up with me in 3 months. She is to have labs done in the next couple of days. Levothyroxine has been refilled. She was advised to take the PPI on an empty stomach    Lolly was seen today for hypothyroidism.    Diagnoses and all orders for this visit:    Panic attacks    Anxiety  -     LORazepam (ATIVAN) 1 MG Tab; Take 0.5 Tabs by mouth every 8 hours as needed for Anxiety for up to 90 days.    Gastroesophageal reflux disease without esophagitis    Hypothyroidism, unspecified type  -     levothyroxine (SYNTHROID) 88 MCG Tab; Take 1 Tab by mouth every morning. ON AN EMPTY STOMACH          Please note that this dictation  was created using voice recognition software. I have made every reasonable attempt to correct obvious errors, but I expect that there are errors of grammar and possibly content that I did not discover before finalizing the note.

## 2018-06-07 NOTE — PATIENT INSTRUCTIONS
Take pantoprazole (protonix) first thing in am when you get up on empty stomach, wait about 30 minutes before taking thyroid medication, wait about 30-40 minutes then can eat    Have your labs done    meds refilled    See me before September 5th

## 2018-06-08 NOTE — ASSESSMENT & PLAN NOTE
This is a chronic condition, stable and fairly well controlled on current medications including levothyroxine 80 mcg daily.  She is past due for labs, these have been ordered by her past PCP and she can have them done anytime in the next couple of days.  She does need refills, this was called in for her.  She does deny symptoms of hypothyroidism and does understand to take this on an empty stomach first thing in the morning apart from her other medications.

## 2018-06-08 NOTE — ASSESSMENT & PLAN NOTE
This is a chronic condition, stable fairly well controlled on Protonix 40 mg daily.  She recently started this medication.  She reports to be good at avoiding aggravating foods and activities.  However she did not know that this was to be taken on an empty stomach first thing in the morning, she will start doing this.

## 2018-06-11 ENCOUNTER — TELEPHONE (OUTPATIENT)
Dept: MEDICAL GROUP | Facility: PHYSICIAN GROUP | Age: 78
End: 2018-06-11

## 2018-06-11 NOTE — TELEPHONE ENCOUNTER
1. Caller Name: Lolly Florence                                           Call Back Number: [phone] (home)       Patient approves a detailed voicemail message: no    Asking for refill Ativan. Informed approved 6/7/18.  Contacted with [facility] and approved refill by phone.  Lolly notified

## 2018-07-03 ENCOUNTER — OFFICE VISIT (OUTPATIENT)
Dept: MEDICAL GROUP | Facility: PHYSICIAN GROUP | Age: 78
End: 2018-07-03
Payer: MEDICARE

## 2018-07-03 VITALS
BODY MASS INDEX: 22.58 KG/M2 | SYSTOLIC BLOOD PRESSURE: 116 MMHG | OXYGEN SATURATION: 97 % | HEIGHT: 60 IN | RESPIRATION RATE: 16 BRPM | WEIGHT: 115 LBS | DIASTOLIC BLOOD PRESSURE: 62 MMHG | HEART RATE: 83 BPM | TEMPERATURE: 98.2 F

## 2018-07-03 DIAGNOSIS — E03.9 HYPOTHYROIDISM, UNSPECIFIED TYPE: ICD-10-CM

## 2018-07-03 DIAGNOSIS — E78.5 DYSLIPIDEMIA: ICD-10-CM

## 2018-07-03 DIAGNOSIS — F41.0 PANIC ATTACKS: ICD-10-CM

## 2018-07-03 PROCEDURE — 99214 OFFICE O/P EST MOD 30 MIN: CPT | Performed by: FAMILY MEDICINE

## 2018-07-03 RX ORDER — LORAZEPAM 0.5 MG/1
0.5 TABLET ORAL 2 TIMES DAILY PRN
Qty: 60 TAB | Refills: 2 | Status: SHIPPED | OUTPATIENT
Start: 2018-07-03 | End: 2018-08-02

## 2018-07-03 RX ORDER — LEVOTHYROXINE SODIUM 88 UG/1
88 TABLET ORAL EVERY MORNING
Qty: 90 TAB | Refills: 3 | Status: SHIPPED | OUTPATIENT
Start: 2018-07-03 | End: 2021-12-16

## 2018-07-03 ASSESSMENT — PATIENT HEALTH QUESTIONNAIRE - PHQ9: CLINICAL INTERPRETATION OF PHQ2 SCORE: 0

## 2018-07-03 NOTE — ASSESSMENT & PLAN NOTE
She is on lorazepam 0.5 mg to 1 mg twice a day. She is taking it daily.   Advised her to stay at 0.25 to 0.5 mg only if needed. She does insist that she does not want to take an SSRI was on prozac in the past.   Advised her not to take any alcohol with this  Advised her not to take this medication on a daily basis  Advised to only get refills at clinic visits.

## 2018-07-03 NOTE — PROGRESS NOTES
Subjective:   Lolly Florence is a 78 y.o. female here today for evaluation and management of:     Dyslipidemia  Chronic condition, due for recheck    Hypothyroidism  Chronic condition, TSH normal last year, due for recheck. Is on levothyroxine 80 mcg    Panic attacks  She is on lorazepam 0.5 mg to 1 mg twice a day. She is taking it daily.   Advised her to stay at 0.25 to 0.5 mg only if needed. She does insist that she does not want to take an SSRI was on prozac in the past.   Advised her not to take any alcohol with this  Advised her not to take this medication on a daily basis  Advised to only get refills at clinic visits.            Current medicines (including changes today)  Current Outpatient Prescriptions   Medication Sig Dispense Refill   • LORazepam (ATIVAN) 0.5 MG Tab Take 1 Tab by mouth 2 times a day as needed for Anxiety for up to 30 days. Take 1/2 tablet when possible, use least amount possible and try to wean down the lorazepam. 60 Tab 2   • levothyroxine (SYNTHROID) 88 MCG Tab Take 1 Tab by mouth every morning. ON AN EMPTY STOMACH 90 Tab 3   • DHA-Vitamin C-Lutein (EYE HEALTH FORMULA PO) Take  by mouth.     • ibuprofen (MOTRIN) 200 MG Tab Take 200 mg by mouth every 6 hours as needed.     • acetaminophen (TYLENOL) 500 MG TABS Take 500-1,000 mg by mouth every 6 hours as needed.       No current facility-administered medications for this visit.      She  has a past medical history of Bradycardia (10/15/2014); Depression (5/15/2013); GERD (gastroesophageal reflux disease) (3/18/2015); Hyperlipemia (3/18/2015); Hyperlipidemia (12/2/2014); Hypertension; Hypothyroid; Hypothyroidism (5/15/2013); Osteoarthritis (5/15/2013); Osteopenia; Panic attacks (5/15/2013); and Vertigo (10/15/2014).    ROS  No chest pain, no shortness of breath, no abdominal pain       Objective:     Blood pressure 116/62, pulse 83, temperature 36.8 °C (98.2 °F), resp. rate 16, height 1.524 m (5'), weight 52.2 kg (115 lb), SpO2  97 %. Body mass index is 22.46 kg/m².   Physical Exam:  Constitutional: Alert, no distress.  Skin: Warm, dry, good turgor, no rashes in visible areas.  Eye: Equal, round and reactive, conjunctiva clear, lids normal.  ENMT: Lips without lesions, good dentition, oropharynx clear.  Neck: Trachea midline, no masses, no thyromegaly. No cervical or supraclavicular lymphadenopathy  Respiratory: Unlabored respiratory effort, lungs clear to auscultation, no wheezes, no ronchi.  Cardiovascular: Normal S1, S2, no murmur, no edema.  Abdomen: Soft, non-tender, no masses, no hepatosplenomegaly.  Psych: Alert and oriented x3, normal affect and mood.        Assessment and Plan:   The following treatment plan was discussed    1. Dyslipidemia  Recheck labs.     2. Hypothyroidism, unspecified type  Due for labs.     3. Panic attacks  Refill provided for 3 months  - LORazepam (ATIVAN) 0.5 MG Tab; Take 1 Tab by mouth 2 times a day as needed for Anxiety for up to 30 days. Take 1/2 tablet when possible, use least amount possible and try to wean down the lorazepam.  Dispense: 60 Tab; Refill: 2      Followup: No Follow-up on file.

## 2018-07-12 ENCOUNTER — TELEPHONE (OUTPATIENT)
Dept: MEDICAL GROUP | Facility: PHYSICIAN GROUP | Age: 78
End: 2018-07-12

## 2018-07-13 NOTE — TELEPHONE ENCOUNTER
Lolly asking for explanation of why her dosage of Ativan changed. States she never agreed to this and is having a hard time.  Asking for Dr Green to please call her [phone]

## 2018-07-16 NOTE — TELEPHONE ENCOUNTER
Please advise patient that I discussed the decrease in dose with her at our last visit. I explained due to her age that the ativan is dangerous if taken in higher doses on a daily basis.   Advised her to stay at 0.25 to 0.5 mg only if needed.   Advised her not to take any alcohol with this  Advised her not to take this medication on a daily basis.   If she would like to start an SSRI like paxil to help with the anxiety I can order this to her pharmacy.   Jocelyn Green M.D.

## 2018-07-19 NOTE — TELEPHONE ENCOUNTER
Pharmacist given the Ok verbally by me to provide the July prescription for ativan.   Jocelyn Green M.D.

## 2019-10-15 NOTE — ASSESSMENT & PLAN NOTE
Patient no longer takes medication for pain management. She will take tylenol or advil as needed. She is taking a benzo and understands she cannot take both for her medications.    n/a

## 2021-01-11 DIAGNOSIS — Z23 NEED FOR VACCINATION: ICD-10-CM

## 2022-02-06 PROBLEM — G63 POLYNEUROPATHY IN OTHER DISEASES CLASSIFIED ELSEWHERE (HCC): Status: ACTIVE | Noted: 2022-02-06

## 2022-02-06 PROBLEM — I73.9 PVD (PERIPHERAL VASCULAR DISEASE) (HCC): Status: ACTIVE | Noted: 2022-02-06

## 2022-02-06 PROBLEM — U07.1 COVID-19: Status: ACTIVE | Noted: 2022-02-06

## 2022-02-06 PROBLEM — F01.518 VASCULAR DEMENTIA WITH BEHAVIOR DISTURBANCE (HCC): Status: ACTIVE | Noted: 2022-02-06

## 2022-03-15 ENCOUNTER — OFFICE VISIT (OUTPATIENT)
Dept: NEUROLOGY | Facility: MEDICAL CENTER | Age: 82
End: 2022-03-15
Attending: PSYCHIATRY & NEUROLOGY
Payer: MEDICARE

## 2022-03-15 VITALS
HEART RATE: 69 BPM | TEMPERATURE: 99.5 F | DIASTOLIC BLOOD PRESSURE: 64 MMHG | OXYGEN SATURATION: 95 % | SYSTOLIC BLOOD PRESSURE: 120 MMHG | RESPIRATION RATE: 16 BRPM

## 2022-03-15 DIAGNOSIS — F03.B18 MODERATE DEMENTIA WITH BEHAVIORAL DISTURBANCE (HCC): ICD-10-CM

## 2022-03-15 DIAGNOSIS — G30.1 ALZHEIMER'S DISEASE WITH LATE ONSET (CODE) (HCC): ICD-10-CM

## 2022-03-15 DIAGNOSIS — G30.9 ALZHEIMER'S DISEASE, UNSPECIFIED (CODE) (HCC): ICD-10-CM

## 2022-03-15 PROBLEM — E53.8 LOW SERUM VITAMIN B12: Status: ACTIVE | Noted: 2022-03-15

## 2022-03-15 PROBLEM — M81.0 OSTEOPOROSIS: Status: ACTIVE | Noted: 2022-03-15

## 2022-03-15 PROBLEM — R03.0 ELEVATED BLOOD PRESSURE READING WITHOUT DIAGNOSIS OF HYPERTENSION: Status: ACTIVE | Noted: 2022-03-15

## 2022-03-15 PROBLEM — E55.9 VITAMIN D DEFICIENCY: Status: ACTIVE | Noted: 2022-03-15

## 2022-03-15 PROBLEM — R41.3 MEMORY IMPAIRMENT: Status: ACTIVE | Noted: 2022-03-15

## 2022-03-15 PROCEDURE — 99205 OFFICE O/P NEW HI 60 MIN: CPT | Performed by: PSYCHIATRY & NEUROLOGY

## 2022-03-15 PROCEDURE — 99212 OFFICE O/P EST SF 10 MIN: CPT | Performed by: PSYCHIATRY & NEUROLOGY

## 2022-03-15 RX ORDER — CHOLECALCIFEROL (VITAMIN D3) 125 MCG
5000 CAPSULE ORAL DAILY
COMMUNITY

## 2022-03-15 RX ORDER — MECLIZINE HYDROCHLORIDE 25 MG/1
25 TABLET ORAL EVERY 12 HOURS PRN
COMMUNITY
Start: 2021-08-19

## 2022-03-15 RX ORDER — MAGNESIUM HYDROXIDE/ALUMINUM HYDROXICE/SIMETHICONE 120; 1200; 1200 MG/30ML; MG/30ML; MG/30ML
SUSPENSION ORAL
COMMUNITY
Start: 2021-08-19 | End: 2022-05-04

## 2022-03-15 RX ORDER — ACETAMINOPHEN 10 MG/ML
INJECTION, SOLUTION INTRAVENOUS
COMMUNITY
Start: 2021-08-19 | End: 2022-05-04

## 2022-03-15 RX ORDER — CYANOCOBALAMIN (VITAMIN B-12) 500 MCG
TABLET ORAL
COMMUNITY

## 2022-03-15 RX ORDER — CALCIUM CARBONATE 750 MG/1
750 TABLET, CHEWABLE ORAL
COMMUNITY
Start: 2021-08-19 | End: 2022-05-04

## 2022-03-15 ASSESSMENT — MONTREAL COGNITIVE ASSESSMENT (MOCA)
11. FOR EACH PAIR OF WORDS, WHAT CATEGORY DO THEY BELONG TO (OUT OF 2): 0/2
WHAT IS THE VERSION OF MOCA ADMINISTERED: 7.1
5. MEMORY TRIALS: SECOND TRIAL
9. REPEAT EACH SENTENCE: 1/2
CATEGORY CUE (IF APPLICABLE): 3
WHAT IS THE TOTAL SCORE (OUT OF 30): 6
10. [FLUENCY] NAME WORDS STARTING WITH DESIGNATED LETTER: 0/1
ADD 1 POINT IF LESS THAN OR EQUAL TO 12 YR EDUCATION LEVEL: 1
6. READ LIST OF DIGITS [FORWARD/BACKWARD]: 1/2
3. DRAW A CLOCK: CONTOUR, NUMBERS, HANDS: 0/3
ORIENTATION SUBSCORE: 1/6
7. [VIGILENCE] TAP WHEN HEARING DESIGNATED LETTER: 1/1
DELAYED RECALL SUBSCORE: 0/5
8. SERIAL SUBTRACTION OF 7S: 0/5
2. COPY DRAWING: 0/1
4. NAME EACH OF THE THREE ANIMALS SHOWN: 1/3

## 2022-03-15 ASSESSMENT — PATIENT HEALTH QUESTIONNAIRE - PHQ9: CLINICAL INTERPRETATION OF PHQ2 SCORE: 0

## 2022-03-15 NOTE — PROGRESS NOTES
"Reason for Neurology Consult:  Dementia    History of present illness:    Lolly Florence 81 y.o. right handed woman who is here with her daughter. She used to be a  and  ed.  She is  and lives in [place] at the [facility] in April 2021> [facility] to [facility] in December 2021.    Problem List reviewed.    When asked directly she has not clearly noticed any changes in her memory.    She is here with her daughter who gives more history today.  Daughter started to notice issues with Lolly in the summer of 2020 including general problems \"remembering things\" and then started to have problems operating her telephone (land line[place] set) sometimes forgetting to charge her phone which made it difficult for her to speak with her family. She was even having difficulty recognizing how to turn the heat on even though it was cold in her trailer. She started to even have to write the directions of how to change the temperature gauge (thermostat) She would even put a cup of tea in the microwave and was noticing that she would have difficulty either setting the time or recognizing that the microwave noise was a microwave noise.    The daughter endorses today that \"Mirza's\" memory is very impaired and her complex reasoning has deteriorated. Son (DPOA) has been doing her finances.    The daughter here today had not spoken with her mother because Lolly did not want to speak with her daughter.    There has been no clear or known paranoia,delusions,visual-auditory hallucinations known to have occurred or observed by daughter.    For 20 years ago or so Mirza has had \"panick attack\"- she has noticed rapid heart racing and she would think something is wrong with her heart and she gets agitated and getting her calm requires someone sitting besides her and talking with her. Mirza would request someone to speak with her and this would often calm  her down. She used to take Ativan PRN.    No " discrete concussions events known to have occurred.    No involuntary movements known to have occurred in the last 3-6 months.    Mirza denies suicidality (thoughts,plans,attempts) when specifically asked in her recent or prior adult life.    No significant alcohol or tobacco use in adult life.    She tends to walk with a cane or a walker- she has fallen once in the last year.     Average sleep of 7-8 hours most nights. No REM Sleep Behavioral symptoms.    Mirza denies any headache(s),visual disturbances, slurred speech,dysphagia.        Sister: Dementia (age 80 now and started in the last 1-2 years)      Patient Active Problem List    Diagnosis Date Noted   • COVID-19 02/06/2022   • Polyneuropathy in other diseases classified elsewhere ([facility]) 02/06/2022   • PVD (peripheral vascular disease) ([facility]) 02/06/2022   • Vascular dementia with behavior disturbance ([facility]) 02/06/2022   • Anxiety 06/07/2018   • Trigger little finger of right hand 08/11/2017   • Essential hypertension, benign 06/20/2017   • Dyslipidemia 06/20/2017   • Torn meniscus 12/12/2016   • Left knee pain 07/05/2016   • Macular degeneration 02/23/2016   • Gallstones 06/22/2015   • PVC (premature ventricular contraction) 04/17/2015   • Carotid artery stenosis 04/17/2015   • GERD (gastroesophageal reflux disease) 03/18/2015   • Seborrheic keratoses 07/16/2013   • Diverticular disease 05/15/2013   • Panic attacks 05/15/2013   • Depression 05/15/2013   • Osteopenia 05/15/2013   • Osteoarthritis 05/15/2013   • Hypothyroidism 05/15/2013       Past medical history:   Past Medical History:   Diagnosis Date   • Bradycardia 10/15/2014   • Depression 5/15/2013   • GERD (gastroesophageal reflux disease) 3/18/2015   • Hyperlipemia 3/18/2015   • Hyperlipidemia 12/2/2014   • Hypertension    • Hypothyroid    • Hypothyroidism 5/15/2013   • Osteoarthritis 5/15/2013   • Osteopenia    • Panic attacks 5/15/2013   • Vertigo 10/15/2014       Past surgical history:   Past  Surgical History:   Procedure Laterality Date   • HIATAL HERNIA REPAIR  2007         Social history:   Social History     Socioeconomic History   • Marital status: Unknown     Spouse name: Not on file   • Number of children: Not on file   • Years of education: Not on file   • Highest education level: Not on file   Occupational History   • Not on file   Tobacco Use   • Smoking status: Never Smoker   • Smokeless tobacco: Never Used   Substance and Sexual Activity   • Alcohol use: Yes     Alcohol/week: 0.0 oz     Comment: rare   • Drug use: No   • Sexual activity: Never   Other Topics Concern   • Not on file   Social History Narrative   • Not on file     Social Determinants of Health     Financial Resource Strain: Not on file   Food Insecurity: Not on file   Transportation Needs: Not on file   Physical Activity: Not on file   Stress: Not on file   Social Connections: Not on file   Intimate Partner Violence: Not on file   Housing Stability: Not on file       Family history:   Family History   Problem Relation Age of Onset   • Heart Disease Father    • Diabetes Father    • Arthritis Mother         RA         Current medications:   Current Outpatient Medications   Medication   • calcium carbonate (TUMS CHEWY BITES) 750 MG chewable tablet   • acetaminophen (OFIRMEV) 10 MG/ML Solution   • alum & mag hydroxide-simeth (MYLANTA) 200-200-20 MG/5ML Suspension   • diclofenac sodium (VOLTAREN) 1 % Gel   • meclizine (ANTIVERT) 25 MG Tab   • Cyanocobalamin (VITAMIN B 12) 500 MCG Tab   • cholecalciferol (D-3-5) 5000 UNIT Cap   • levothyroxine (SYNTHROID) 100 MCG Tab   • lisinopril (PRINIVIL) 10 MG Tab   • Menthol, Topical Analgesic, (BIOFREEZE) 4 % Gel   • sodium chloride (LILLY 128) 2 % ophthalmic solution   • hydrOXYzine HCl (ATARAX) 50 MG Tab   • TUMS ULTRA 1000 1000 MG Chew Tab   • ibuprofen (MOTRIN) 200 MG Tab   • Soft Lens Products (B&L SENSITIVE EYES) Solution   • busPIRone (BUSPAR) 5 MG tablet   • DHA-Vitamin C-Lutein (EYE  HEALTH FORMULA PO)   • acetaminophen (TYLENOL) 500 MG TABS     No current facility-administered medications for this visit.       Medication Allergy:  Allergies   Allergen Reactions   • Tramadol Shortness of Breath   • Prozac [Fluoxetine] Vomiting and Nausea           Physical examination:   Vitals:    03/15/22 1341   BP: 120/64   BP Location: Left arm   Patient Position: Sitting   BP Cuff Size: Adult   Pulse: 69   Resp: 16   Temp: 37.5 °C (99.5 °F)   TempSrc: Temporal   SpO2: 95%       Normal cephalic atraumatic.  There is full range of movement around the neck in all directions without restrictions or discrete pain evoked triggers.  No lower extremity edema.      Neurological  Exam:      [place] Cognitive Assessment (MOCA) Version 7.1    Years of Education: HIGH SCHOOL    TOTAL SCORE: 6/30  (to be scanned into the MEDIA section in the E.M.R.)          Mental status: Awake, alert and fully oriented to person and place. Normal attention and concentration.  Did not appear/act combative,irritable,anxious,paranoid/delusional or aggressive to or with me.    Speech and language: Speech is fluent without errors, clear and intact to repetition.     Follows 3 step motor commands in sequence without significant delay and correctly.    Cranial nerve exam:  II: Pupils are equally round and reactive to light. Visual fields are intact by confrontation.  III, IV, VI: EOMI, no diplopia, no ptosis.  V: Sensation to light touch is normal over V1-3 distributions bilaterally.  .  VII: Facial movements are symmetrical. There is no facial droop. .  VIII: Hearing intact to soft speech and finger rub bilaterally  IX: Palate elevates symmetrically, uvula is midline. Dysarthria is not present.  XI: Shoulder shrug are symmetrical and strong.   XII: Tongue protrudes midline. and No tongue fasciculations.      Motor exam:  Muscle tone is normal in all 4 limbs. and No abnormal movements appreciated.    Muscle strength:    Neck  Flexors/Extensors: 5/5       Right  Left  Deltoid   5/5  5/5      Biceps   5/5  5/5  Triceps  5/5  5/5   Wrist extensors 5/5  5/5  Wrist flexors  5/5  5/5     5/5  5/5  Interossei  5/5  5/5  Thenar (APB)  5/5  5/5   Hip flexors  5/5  5/5  Quadriceps  5/5  5/5    Hamstrings  5/5  5/5  Dorsiflexors  5/5  5/5  Plantarflexors  5/5  5/5  Toe extension  5/5  5/5      Sensory exam:  Intact to Vibration and Proprioception in bilaterally lower extremity.    Reflexes:       Right  Left  Biceps   2/4  2/4  Triceps  2/4  2/4  Brachioradialis 2/4  2/4  Knee jerk  2/4  2/4  Ankle jerk  2/4  2/4     Frontal release signs are absent    bilateral toes are downgoing to plantar stimulation..    Coordination (finger-to-nose, heel/knee/shin, rapid alternating movements) was normal.     There was no ataxia, no tremors, and no dysmetria.     Station and gait >    Easily stands with minimal cautiousness but is independently stands up from exam chair without retropulsion,veering,leaning,swaying (to either side).       Labs and Tests:     NEUROIMAGING:       Impression/Plans/Recommendations:    1. Moderate Stage of Dementia- probably Alz type.    Mirza is not parkinsonian at this time.    Onset of symptoms well over 1.5 years ago- history supported by daughter's information.    MOCA of 6/30 today.    FAQ today of 26.    Global Deterioration Score in the 5 to 6 range.    Today, I reviewed the clinical criteria and reviewed several  scenarios of the differences being using the medical terms of normal brain aging (age associated memory impairment),  Mild Cognitive Impairment (MCI) and Dementia.    Dementia  is a syndrome but statistically and for the majority of patients  occurs due  to a more rapid aging of the brain tissue or potentially from injury to certain parts of one's brain ( often from such contributing factors as  the cumulative effects of alcohol, from one or more ischemic or hemmorhagic stroke(s), from neurodegeneration or  "long standing with/without suboptimally controlled Hypertension). It is for some of these potential factors as to why I recommend a brain imaging test (Head CT or Brain MRI) be done for the 1st time or in certain circumstances repeated for comparison purposes  as such imaging can suggest one or more factors as to the reason(s) for the person's cognitive/memory changes. In fact, a normal or \"age related\" finding on a brain imaging test in and of itself is useful clinical and objective information to have in the evaluation of a person who has either an acute, evolving or even chronic (months to years) long cognitive/memory complaint.    Additional factors or contributors to Brain Health issues can be summarized in several books/references which I discussed as well today.     Goals going forwards include:    A. Paying attention to one's risk factors and reducing their prevalence or potential impact on one's changing memory/thinking> an excellent example would be to stop smoking, reduce or eliminate alcohol use (depending on the amount and frequency of usage), maintain good blood pressure control by buying a digital arm blood pressure cuff such as an OMRON Series 3 or 5 and checking one's blood pressure atleast 3 days per week (in the am and early afternoon) that the numbers are under 140/90 and working as needed with the primary care doctor  to optimize blood pressure control).    B. Encouraging proper sleep hygiene which for most adults is 7 to 8 hours of uninterrupted sleep per night.      C. Encouraging some form of exercise preferable aerobic forms to be done (4 to 5 days per week- 30 minutes minimum per day)> 150 minutes per week as a goal. Example activities could include fast walking (up a slight incline), jogging, cycling (road or stationary), swimming,tennis. Essentially, even basic walking on a flat surface or a treadmill would be better than doing nothing.    D. Maintaining or forming new social contacts " with family and friends  and a positive attitude despite the concerns and/or ongoing issues with thinking and/or memory.    E. Eating well which means a diet low in salt  (under 2 grams per day), sugar and saturated fat.    F. Maintaining one's BMI (Body Mass Index) under 30.    G. Consideration of the use of cognitive enhancers (acetylcholinerase inhibitors such as Aricept vs an NMDA Receptor agent like Namenda). Pros and cons of such compounds in terms of predicted efficacy and side effects profiles reviewed. At this point will not start these medications.    H Brain MRI to be ordered; Brain PET Scan  Not to be ordered after discussion with Mirza and her daughter.    I.  No driving anymore.    J. [facility] Form to be filled out-> concern about paying for continued [facility] stay.      I have performed  a history and physical exam and a directed /focused  ROS today.    Total time spent today or this patient's care was 60 minutes  and included reviewing  the diagnostic workup to date (such as labs and imaging as well as interpreting such tests relevant to this patient's neurological condition),  reviewing/obtaining separately obtained history (from patient and/or accompanying ffamily member)  for today's neurological problem(s) ,counseling and educating the patient and family member on issues related to cognition/memory and cognitive health factors and documenting  the clinical information in the EMR.    Follow up PRN at this time.        Francisco Morse MD  [facility].   [facility]

## 2022-03-22 ENCOUNTER — TELEPHONE (OUTPATIENT)
Dept: NEUROLOGY | Facility: MEDICAL CENTER | Age: 82
End: 2022-03-22
Payer: MEDICARE